# Patient Record
Sex: FEMALE | Race: OTHER | Employment: OTHER | URBAN - METROPOLITAN AREA
[De-identification: names, ages, dates, MRNs, and addresses within clinical notes are randomized per-mention and may not be internally consistent; named-entity substitution may affect disease eponyms.]

---

## 2020-10-08 ENCOUNTER — APPOINTMENT (OUTPATIENT)
Dept: ULTRASOUND IMAGING | Age: 25
End: 2020-10-08
Attending: PHYSICIAN ASSISTANT
Payer: COMMERCIAL

## 2020-10-08 ENCOUNTER — HOSPITAL ENCOUNTER (EMERGENCY)
Age: 25
Discharge: HOME | End: 2020-10-08
Attending: EMERGENCY MEDICINE
Payer: COMMERCIAL

## 2020-10-08 VITALS
HEIGHT: 61 IN | BODY MASS INDEX: 40.22 KG/M2 | TEMPERATURE: 97.8 F | SYSTOLIC BLOOD PRESSURE: 132 MMHG | OXYGEN SATURATION: 100 % | RESPIRATION RATE: 16 BRPM | DIASTOLIC BLOOD PRESSURE: 72 MMHG | HEART RATE: 60 BPM | WEIGHT: 213 LBS

## 2020-10-08 DIAGNOSIS — N93.8 DUB (DYSFUNCTIONAL UTERINE BLEEDING): Primary | ICD-10-CM

## 2020-10-08 DIAGNOSIS — N83.201 RIGHT OVARIAN CYST: ICD-10-CM

## 2020-10-08 DIAGNOSIS — Z97.5 IUD (INTRAUTERINE DEVICE) IN PLACE: ICD-10-CM

## 2020-10-08 LAB
ALBUMIN SERPL-MCNC: 3.3 G/DL (ref 3.4–5)
ALBUMIN/GLOB SERPL: 0.8 {RATIO} (ref 0.8–1.7)
ALP SERPL-CCNC: 87 U/L (ref 45–117)
ALT SERPL-CCNC: 20 U/L (ref 13–56)
ANION GAP SERPL CALC-SCNC: 3 MMOL/L (ref 3–18)
APPEARANCE UR: CLEAR
AST SERPL-CCNC: 15 U/L (ref 10–38)
BASOPHILS # BLD: 0 K/UL (ref 0–0.1)
BASOPHILS NFR BLD: 0 % (ref 0–2)
BILIRUB SERPL-MCNC: 0.3 MG/DL (ref 0.2–1)
BILIRUB UR QL: NEGATIVE
BUN SERPL-MCNC: 19 MG/DL (ref 7–18)
BUN/CREAT SERPL: 17 (ref 12–20)
CALCIUM SERPL-MCNC: 9 MG/DL (ref 8.5–10.1)
CHLORIDE SERPL-SCNC: 107 MMOL/L (ref 100–111)
CO2 SERPL-SCNC: 30 MMOL/L (ref 21–32)
COLOR UR: YELLOW
CREAT SERPL-MCNC: 1.11 MG/DL (ref 0.6–1.3)
DIFFERENTIAL METHOD BLD: ABNORMAL
EOSINOPHIL # BLD: 0.1 K/UL (ref 0–0.4)
EOSINOPHIL NFR BLD: 1 % (ref 0–5)
ERYTHROCYTE [DISTWIDTH] IN BLOOD BY AUTOMATED COUNT: 14.2 % (ref 11.6–14.5)
GLOBULIN SER CALC-MCNC: 4.1 G/DL (ref 2–4)
GLUCOSE SERPL-MCNC: 81 MG/DL (ref 74–99)
GLUCOSE UR STRIP.AUTO-MCNC: NEGATIVE MG/DL
HCG UR QL: NEGATIVE
HCT VFR BLD AUTO: 40 % (ref 35–45)
HGB BLD-MCNC: 12.5 G/DL (ref 12–16)
HGB UR QL STRIP: NEGATIVE
KETONES UR QL STRIP.AUTO: NEGATIVE MG/DL
LEUKOCYTE ESTERASE UR QL STRIP.AUTO: NEGATIVE
LYMPHOCYTES # BLD: 2 K/UL (ref 0.9–3.6)
LYMPHOCYTES NFR BLD: 20 % (ref 21–52)
MCH RBC QN AUTO: 26.7 PG (ref 24–34)
MCHC RBC AUTO-ENTMCNC: 31.3 G/DL (ref 31–37)
MCV RBC AUTO: 85.3 FL (ref 74–97)
MONOCYTES # BLD: 0.7 K/UL (ref 0.05–1.2)
MONOCYTES NFR BLD: 7 % (ref 3–10)
NEUTS SEG # BLD: 7.2 K/UL (ref 1.8–8)
NEUTS SEG NFR BLD: 72 % (ref 40–73)
NITRITE UR QL STRIP.AUTO: NEGATIVE
PH UR STRIP: 6 [PH] (ref 5–8)
PLATELET # BLD AUTO: 385 K/UL (ref 135–420)
PMV BLD AUTO: 9.3 FL (ref 9.2–11.8)
POTASSIUM SERPL-SCNC: 4 MMOL/L (ref 3.5–5.5)
PROT SERPL-MCNC: 7.4 G/DL (ref 6.4–8.2)
PROT UR STRIP-MCNC: NEGATIVE MG/DL
RBC # BLD AUTO: 4.69 M/UL (ref 4.2–5.3)
SERVICE CMNT-IMP: NORMAL
SODIUM SERPL-SCNC: 140 MMOL/L (ref 136–145)
SP GR UR REFRACTOMETRY: 1.03 (ref 1–1.03)
UROBILINOGEN UR QL STRIP.AUTO: 1 EU/DL (ref 0.2–1)
WBC # BLD AUTO: 10 K/UL (ref 4.6–13.2)
WET PREP GENITAL: NORMAL

## 2020-10-08 PROCEDURE — 87210 SMEAR WET MOUNT SALINE/INK: CPT

## 2020-10-08 PROCEDURE — 81025 URINE PREGNANCY TEST: CPT

## 2020-10-08 PROCEDURE — 80053 COMPREHEN METABOLIC PANEL: CPT

## 2020-10-08 PROCEDURE — 85025 COMPLETE CBC W/AUTO DIFF WBC: CPT

## 2020-10-08 PROCEDURE — 87491 CHLMYD TRACH DNA AMP PROBE: CPT

## 2020-10-08 PROCEDURE — 99284 EMERGENCY DEPT VISIT MOD MDM: CPT

## 2020-10-08 PROCEDURE — 81003 URINALYSIS AUTO W/O SCOPE: CPT

## 2020-10-08 PROCEDURE — 76830 TRANSVAGINAL US NON-OB: CPT

## 2020-10-08 RX ORDER — LEVONORGESTREL 52 MG/1
1 INTRAUTERINE DEVICE INTRAUTERINE ONCE
Status: ON HOLD | COMMUNITY
End: 2022-04-02 | Stop reason: CLARIF

## 2020-10-08 NOTE — ED PROVIDER NOTES
EMERGENCY DEPARTMENT HISTORY AND PHYSICAL EXAM    Date: 10/8/2020  Patient Name: Kelsey Zaragoza    History of Presenting Illness     Chief Complaint   Patient presents with    Vaginal Bleeding         History Provided By: Patient    Chief Complaint: vaginal bleeding    HPI(Context):   2:03 PM  Ronnie Schwartz is a 26 y.o. female with PMHX of interstitial cystitis who presents to the emergency department C/O vaginal bleeding. Sxs x 3 weeks. Reports intermittent pelvic cramping. [place]. Pt passed several clots yesterday. Pt reports new sexual partner one month ago but no known exposures. Pt denies fever, chills, dizziness, hx of DUB, and any other sxs or complaints. PCP: Sydnee Payton MD    Current Outpatient Medications   Medication Sig Dispense Refill    levonorgestreL (Mirena) 20 mcg/24 hours (5 yrs) 52 mg IUD 1 Device by IntraUTERine route once. Past History     Past Medical History:  Past Medical History:   Diagnosis Date    Interstitial cystitis     Nicotine vapor product user        Past Surgical History:  Past Surgical History:   Procedure Laterality Date    HX REFRACTIVE SURGERY         Family History:  History reviewed. No pertinent family history. Social History:  Social History     Tobacco Use    Smoking status: Never Smoker    Smokeless tobacco: Never Used   Substance Use Topics    Alcohol use: Not Currently    Drug use: Yes     Types: Marijuana       Allergies:  No Known Allergies      Review of Systems   Review of Systems   Gastrointestinal: Negative for abdominal pain, nausea and vomiting. Genitourinary: Positive for pelvic pain and vaginal bleeding. Negative for dysuria. Musculoskeletal: Negative for back pain. Neurological: Negative for dizziness and light-headedness. Hematological: Does not bruise/bleed easily. All other systems reviewed and are negative.       Physical Exam     Vitals:    10/08/20 1325 BP: 137/85   Pulse: 66   Resp: 18   Temp: 97.8 °F (36.6 °C)   SpO2: 99%   Weight: 96.6 kg (213 lb)   Height: 5' 1\" (1.549 m)     Physical Exam  Vitals signs and nursing note reviewed. Constitutional:       General: She is not in acute distress. Appearance: She is well-developed. She is not diaphoretic. Comments:  female in NAD. Alert. Appears comfortable. HENT:      Head: Normocephalic and atraumatic. Right Ear: External ear normal.      Left Ear: External ear normal.      Nose: Nose normal.   Eyes:      Conjunctiva/sclera: Conjunctivae normal.   Neck:      Musculoskeletal: Normal range of motion. Cardiovascular:      Rate and Rhythm: Normal rate and regular rhythm. Heart sounds: Normal heart sounds. No murmur. No friction rub. No gallop. Pulmonary:      Effort: Pulmonary effort is normal. No tachypnea, accessory muscle usage or respiratory distress. Breath sounds: Normal breath sounds. No decreased breath sounds, wheezing, rhonchi or rales. Abdominal:      Palpations: Abdomen is soft. Tenderness: There is no right CVA tenderness, left CVA tenderness or guarding. Negative signs include McBurney's sign. Genitourinary:     Comments: Female chaperone in room. Verbal consent obtained prior to presentation. See procedure note. Musculoskeletal: Normal range of motion. Skin:     General: Skin is warm and dry. Neurological:      Mental Status: She is alert and oriented to person, place, and time.    Psychiatric:         Judgment: Judgment normal.             Diagnostic Study Results     Labs -     Recent Results (from the past 12 hour(s))   URINALYSIS W/ RFLX MICROSCOPIC    Collection Time: 10/08/20  2:00 PM   Result Value Ref Range    Color YELLOW      Appearance CLEAR      Specific gravity 1.028 1.005 - 1.030      pH (UA) 6.0 5.0 - 8.0      Protein Negative NEG mg/dL    Glucose Negative NEG mg/dL    Ketone Negative NEG mg/dL    Bilirubin Negative NEG      Blood Negative NEG      Urobilinogen 1.0 0.2 - 1.0 EU/dL    Nitrites Negative NEG      Leukocyte Esterase Negative NEG     HCG URINE, QL. - POC    Collection Time: 10/08/20  2:23 PM   Result Value Ref Range    Pregnancy test,urine (POC) Negative NEG     CBC WITH AUTOMATED DIFF    Collection Time: 10/08/20  2:39 PM   Result Value Ref Range    WBC 10.0 4.6 - 13.2 K/uL    RBC 4.69 4.20 - 5.30 M/uL    HGB 12.5 12.0 - 16.0 g/dL    HCT 40.0 35.0 - 45.0 %    MCV 85.3 74.0 - 97.0 FL    MCH 26.7 24.0 - 34.0 PG    MCHC 31.3 31.0 - 37.0 g/dL    RDW 14.2 11.6 - 14.5 %    PLATELET 397 716 - 218 K/uL    MPV 9.3 9.2 - 11.8 FL    NEUTROPHILS 72 40 - 73 %    LYMPHOCYTES 20 (L) 21 - 52 %    MONOCYTES 7 3 - 10 %    EOSINOPHILS 1 0 - 5 %    BASOPHILS 0 0 - 2 %    ABS. NEUTROPHILS 7.2 1.8 - 8.0 K/UL    ABS. LYMPHOCYTES 2.0 0.9 - 3.6 K/UL    ABS. MONOCYTES 0.7 0.05 - 1.2 K/UL    ABS. EOSINOPHILS 0.1 0.0 - 0.4 K/UL    ABS. BASOPHILS 0.0 0.0 - 0.1 K/UL    DF AUTOMATED     METABOLIC PANEL, COMPREHENSIVE    Collection Time: 10/08/20  2:39 PM   Result Value Ref Range    Sodium 140 136 - 145 mmol/L    Potassium 4.0 3.5 - 5.5 mmol/L    Chloride 107 100 - 111 mmol/L    CO2 30 21 - 32 mmol/L    Anion gap 3 3.0 - 18 mmol/L    Glucose 81 74 - 99 mg/dL    BUN 19 (H) 7.0 - 18 MG/DL    Creatinine 1.11 0.6 - 1.3 MG/DL    BUN/Creatinine ratio 17 12 - 20      GFR est AA >60 >60 ml/min/1.73m2    GFR est non-AA >60 >60 ml/min/1.73m2    Calcium 9.0 8.5 - 10.1 MG/DL    Bilirubin, total 0.3 0.2 - 1.0 MG/DL    ALT (SGPT) 20 13 - 56 U/L    AST (SGOT) 15 10 - 38 U/L    Alk.  phosphatase 87 45 - 117 U/L    Protein, total 7.4 6.4 - 8.2 g/dL    Albumin 3.3 (L) 3.4 - 5.0 g/dL    Globulin 4.1 (H) 2.0 - 4.0 g/dL    A-G Ratio 0.8 0.8 - 1.7     WET PREP    Collection Time: 10/08/20  4:13 PM    Specimen: Vagina   Result Value Ref Range    Special Requests: NO SPECIAL REQUESTS      Wet prep NO YEAST,TRICHOMONAS OR CLUE CELLS NOTED           US TRANSVAGINAL W DOPPLER   Final Result   IMPRESSION:      1. Intrauterine contraceptive device in expected position. No abnormal   thickening of the endometrial stripe complex identified. 2. Normal blood flow to each ovary. No evidence of ovarian torsion. 3. Right ovarian cyst measuring approximately 3.2 cm in greatest dimension. CT Results  (Last 48 hours)    None        CXR Results  (Last 48 hours)    None          Medications given in the ED-  Medications - No data to display      Medical Decision Making   I am the first provider for this patient. I reviewed the vital signs, available nursing notes, past medical history, past surgical history, family history and social history. Vital Signs-Reviewed the patient's vital signs. Pulse Oximetry Analysis - 99% on RA. NORMAL    Records Reviewed: Nursing Notes    Provider Notes (Medical Decision Making): pregnancy (ectopic), DUB, endometriosis, IUD misplacement, STI    Procedures:  Pelvic Exam    Date/Time: 10/8/2020 2:32 PM  Performed by: PA  Procedure duration:  15 minutes. Documented by: Anna Marie Becker PA-C. Exam assisted by:  Female chaperone in [facility]. Type of exam performed: bimanual and speculum. External genitalia appearance: normal.    Vaginal exam:  bleeding. Bleeding: mild  Cervical exam:  no cervical motion tenderness. Specimen(s) collected:  chlamydia, GC, vaginal culture and products of conception. Bimanual exam:  normal.    Patient tolerance: Patient tolerated the procedure well with no immediate complications          ED Course:   2:03 PM Initial assessment performed. The patients presenting problems have been discussed, and they are in agreement with the care plan formulated and outlined with them. I have encouraged them to ask questions as they arise throughout their visit. Diagnosis and Disposition       Benign abdominal exam. UPT neg. IUD in place. Will instruct GYN FU and await GC/C labs. Reasons to RTED discussed with pt.  All questions answered. Pt feels comfortable going home at this time. Pt expressed understanding and she agrees with plan. 1. DUB (dysfunctional uterine bleeding)    2. IUD (intrauterine device) in place    3. Right ovarian cyst        PLAN:  1. D/C Home  2. Current Discharge Medication List        3. Follow-up Information     Follow up With Specialties Details Why Contact Info    Yoanna Arellano MD Obstetrics & Gynecology, Gynecology, Obstetrics   [address] [facility] [address] [phone]      [facility] EMERGENCY DEPT Emergency Medicine  As needed, If symptoms worsen [address] [facility] [address]  [phone]        _______________________________    Attestations: This note is prepared by Shira Astorga PA-C.  _______________________________          Please note that this dictation was completed with [employer], the computer voice recognition software. Quite often unanticipated grammatical, syntax, homophones, and other interpretive errors are inadvertently transcribed by the computer software. Please disregard these errors. Please excuse any errors that have escaped final proofreading.

## 2020-10-08 NOTE — ED TRIAGE NOTES
Pt w/ c/o vaginal bleeding x3 weeks w/ worsening of bleeding yesterday. Pt reports she had an IUD placed 4 weeks ago and had light bleeding starting a week after placement. Pt states she passed large clots yesterday, denies any dizziness, cp or SOB.

## 2020-10-10 LAB
C TRACH RRNA SPEC QL NAA+PROBE: NEGATIVE
N GONORRHOEA RRNA SPEC QL NAA+PROBE: NEGATIVE
SPECIMEN SOURCE: NORMAL

## 2020-12-04 ENCOUNTER — HOSPITAL ENCOUNTER (EMERGENCY)
Age: 25
Discharge: HOME | End: 2020-12-04
Attending: EMERGENCY MEDICINE
Payer: COMMERCIAL

## 2020-12-04 ENCOUNTER — APPOINTMENT (OUTPATIENT)
Dept: ULTRASOUND IMAGING | Age: 25
End: 2020-12-04
Attending: EMERGENCY MEDICINE
Payer: COMMERCIAL

## 2020-12-04 VITALS
BODY MASS INDEX: 40.48 KG/M2 | OXYGEN SATURATION: 99 % | HEART RATE: 76 BPM | TEMPERATURE: 97.1 F | HEIGHT: 62 IN | WEIGHT: 220 LBS | DIASTOLIC BLOOD PRESSURE: 70 MMHG | RESPIRATION RATE: 16 BRPM | SYSTOLIC BLOOD PRESSURE: 122 MMHG

## 2020-12-04 DIAGNOSIS — N30.00 ACUTE CYSTITIS WITHOUT HEMATURIA: ICD-10-CM

## 2020-12-04 DIAGNOSIS — R10.2 PELVIC PAIN: Primary | ICD-10-CM

## 2020-12-04 LAB
ALBUMIN SERPL-MCNC: 3.3 G/DL (ref 3.4–5)
ALBUMIN/GLOB SERPL: 0.8 {RATIO} (ref 0.8–1.7)
ALP SERPL-CCNC: 92 U/L (ref 45–117)
ALT SERPL-CCNC: 27 U/L (ref 13–56)
ANION GAP SERPL CALC-SCNC: 5 MMOL/L (ref 3–18)
APPEARANCE UR: CLEAR
AST SERPL-CCNC: 17 U/L (ref 10–38)
BACTERIA URNS QL MICRO: ABNORMAL /HPF
BASOPHILS # BLD: 0 K/UL (ref 0–0.1)
BASOPHILS NFR BLD: 0 % (ref 0–2)
BILIRUB SERPL-MCNC: 0.2 MG/DL (ref 0.2–1)
BILIRUB UR QL: NEGATIVE
BUN SERPL-MCNC: 22 MG/DL (ref 7–18)
BUN/CREAT SERPL: 24 (ref 12–20)
CALCIUM SERPL-MCNC: 8.8 MG/DL (ref 8.5–10.1)
CHLORIDE SERPL-SCNC: 106 MMOL/L (ref 100–111)
CO2 SERPL-SCNC: 27 MMOL/L (ref 21–32)
COLOR UR: YELLOW
CREAT SERPL-MCNC: 0.93 MG/DL (ref 0.6–1.3)
DIFFERENTIAL METHOD BLD: ABNORMAL
EOSINOPHIL # BLD: 0.3 K/UL (ref 0–0.4)
EOSINOPHIL NFR BLD: 3 % (ref 0–5)
EPITH CASTS URNS QL MICRO: ABNORMAL /LPF (ref 0–5)
ERYTHROCYTE [DISTWIDTH] IN BLOOD BY AUTOMATED COUNT: 14.7 % (ref 11.6–14.5)
GLOBULIN SER CALC-MCNC: 4 G/DL (ref 2–4)
GLUCOSE SERPL-MCNC: 84 MG/DL (ref 74–99)
GLUCOSE UR STRIP.AUTO-MCNC: NEGATIVE MG/DL
HCG UR QL: NEGATIVE
HCT VFR BLD AUTO: 38.5 % (ref 35–45)
HGB BLD-MCNC: 12.6 G/DL (ref 12–16)
HGB UR QL STRIP: NEGATIVE
KETONES UR QL STRIP.AUTO: NEGATIVE MG/DL
LEUKOCYTE ESTERASE UR QL STRIP.AUTO: ABNORMAL
LYMPHOCYTES # BLD: 2.8 K/UL (ref 0.9–3.6)
LYMPHOCYTES NFR BLD: 25 % (ref 21–52)
MCH RBC QN AUTO: 26.9 PG (ref 24–34)
MCHC RBC AUTO-ENTMCNC: 32.7 G/DL (ref 31–37)
MCV RBC AUTO: 82.1 FL (ref 74–97)
MONOCYTES # BLD: 0.7 K/UL (ref 0.05–1.2)
MONOCYTES NFR BLD: 6 % (ref 3–10)
NEUTS SEG # BLD: 7.4 K/UL (ref 1.8–8)
NEUTS SEG NFR BLD: 66 % (ref 40–73)
NITRITE UR QL STRIP.AUTO: NEGATIVE
PH UR STRIP: 7 [PH] (ref 5–8)
PLATELET # BLD AUTO: 379 K/UL (ref 135–420)
PMV BLD AUTO: 9.5 FL (ref 9.2–11.8)
POTASSIUM SERPL-SCNC: 4.5 MMOL/L (ref 3.5–5.5)
PROT SERPL-MCNC: 7.3 G/DL (ref 6.4–8.2)
PROT UR STRIP-MCNC: NEGATIVE MG/DL
RBC # BLD AUTO: 4.69 M/UL (ref 4.2–5.3)
RBC #/AREA URNS HPF: ABNORMAL /HPF (ref 0–5)
SERVICE CMNT-IMP: NORMAL
SODIUM SERPL-SCNC: 138 MMOL/L (ref 136–145)
SP GR UR REFRACTOMETRY: 1.01 (ref 1–1.03)
UROBILINOGEN UR QL STRIP.AUTO: 0.2 EU/DL (ref 0.2–1)
WBC # BLD AUTO: 11.1 K/UL (ref 4.6–13.2)
WBC URNS QL MICRO: ABNORMAL /HPF (ref 0–5)
WET PREP GENITAL: NORMAL

## 2020-12-04 PROCEDURE — 85025 COMPLETE CBC W/AUTO DIFF WBC: CPT

## 2020-12-04 PROCEDURE — 80053 COMPREHEN METABOLIC PANEL: CPT

## 2020-12-04 PROCEDURE — 81001 URINALYSIS AUTO W/SCOPE: CPT

## 2020-12-04 PROCEDURE — 81025 URINE PREGNANCY TEST: CPT

## 2020-12-04 PROCEDURE — 99284 EMERGENCY DEPT VISIT MOD MDM: CPT

## 2020-12-04 PROCEDURE — 76830 TRANSVAGINAL US NON-OB: CPT

## 2020-12-04 PROCEDURE — 87210 SMEAR WET MOUNT SALINE/INK: CPT

## 2020-12-04 PROCEDURE — 87491 CHLMYD TRACH DNA AMP PROBE: CPT

## 2020-12-04 PROCEDURE — 87086 URINE CULTURE/COLONY COUNT: CPT

## 2020-12-04 RX ORDER — CEPHALEXIN 500 MG/1
500 CAPSULE ORAL 3 TIMES DAILY
Qty: 21 CAP | Refills: 0 | Status: SHIPPED | OUTPATIENT
Start: 2020-12-04 | End: 2020-12-11

## 2020-12-04 NOTE — ED TRIAGE NOTES
Pt to ED w/ pelvic pain, worse to right side, x2wk. Pt reports IUD placement on Sept. 4th. Pt reports intermittent cramping, bleeding and some dysuria. Pt reports increased pain w/ intercourse.

## 2020-12-04 NOTE — DISCHARGE INSTRUCTIONS
Patient Education        Pelvic Pain: Care Instructions  Your Care Instructions     Pelvic pain, or pain in the lower belly, can have many causes. Often pelvic pain is not serious and gets better in a few days. If your pain continues or gets worse, you may need tests and treatment. Tell your doctor about any new symptoms. These may be signs of a serious problem. Follow-up care is a key part of your treatment and safety. Be sure to make and go to all appointments, and call your doctor if you are having problems. It's also a good idea to know your test results and keep a list of the medicines you take. How can you care for yourself at home? · Rest until you feel better. Lie down, and raise your legs by placing a pillow under your knees. · Drink plenty of fluids. You may find that small, frequent sips are easier on your stomach than if you drink a lot at once. Avoid drinks with carbonation or caffeine, such as soda pop, tea, or coffee. · Try eating several small meals instead of 2 or 3 large ones. Eat mild foods, such as rice, dry toast or crackers, bananas, and applesauce. Avoid fatty and spicy foods, other fruits, and alcohol until 48 hours after your symptoms have gone away. · Take an over-the-counter pain medicine, such as acetaminophen (Tylenol), ibuprofen (Advil, Motrin), or naproxen (Aleve). Read and follow all instructions on the label. · Do not take two or more pain medicines at the same time unless the doctor told you to. Many pain medicines have acetaminophen, which is Tylenol. Too much acetaminophen (Tylenol) can be harmful. · You can put a heating pad, a warm cloth, or moist heat on your belly to relieve pain. When should you call for help?    Call your doctor now or seek immediate medical care if:    · You have a new or higher fever.     · You have unusual vaginal bleeding.     · You have new or worse belly or pelvic pain.     · You have vaginal discharge that has increased in amount or smells bad.   Watch closely for changes in your health, and be sure to contact your doctor if:    · You do not get better as expected. Where can you learn more? Go to http://www.gray.com/  Enter B514 in the search box to learn more about \"Pelvic Pain: Care Instructions. \"  Current as of: November 8, 2019               Content Version: 12.6  © 5854-8402 [employer]. Care instructions adapted under license by [employer] (which disclaims liability or warranty for this information). If you have questions about a medical condition or this instruction, always ask your healthcare professional. [place] [address] any warranty or liability for your use of this information. Patient Education        Urinary Tract Infection in Women: Care Instructions  Your Care Instructions     A urinary tract infection, or UTI, is a general term for an infection anywhere between the kidneys and the urethra (where urine comes out). Most UTIs are bladder infections. They often cause pain or burning when you urinate. UTIs are caused by bacteria and can be cured with antibiotics. Be sure to complete your treatment so that the infection goes away. Follow-up care is a key part of your treatment and safety. Be sure to make and go to all appointments, and call your doctor if you are having problems. It's also a good idea to know your test results and keep a list of the medicines you take. How can you care for yourself at home? · Take your antibiotics as directed. Do not stop taking them just because you feel better. You need to take the full course of antibiotics. · Drink extra water and other fluids for the next day or two. This may help wash out the bacteria that are causing the infection. (If you have kidney, heart, or liver disease and have to limit fluids, talk with your doctor before you increase your fluid intake.)  · Avoid drinks that are carbonated or have caffeine. They can irritate the bladder. · Urinate often. Try to empty your bladder each time. · To relieve pain, take a hot bath or lay a heating pad set on low over your lower belly or genital area. Never go to sleep with a heating pad in place. To prevent UTIs  · Drink plenty of water each day. This helps you urinate often, which clears bacteria from your system. (If you have kidney, heart, or liver disease and have to limit fluids, talk with your doctor before you increase your fluid intake.)  · Urinate when you need to. · Urinate right after you have sex. · Change sanitary pads often. · Avoid douches, bubble baths, feminine hygiene sprays, and other feminine hygiene products that have deodorants. · After going to the bathroom, wipe from front to back. When should you call for help? Call your doctor now or seek immediate medical care if:    · Symptoms such as fever, chills, nausea, or vomiting get worse or appear for the first time.     · You have new pain in your back just below your rib cage. This is called flank pain.     · There is new blood or pus in your urine.     · You have any problems with your antibiotic medicine. Watch closely for changes in your health, and be sure to contact your doctor if:    · You are not getting better after taking an antibiotic for 2 days.     · Your symptoms go away but then come back. Where can you learn more? Go to http://www.gray.com/  Enter O509 in the search box to learn more about \"Urinary Tract Infection in Women: Care Instructions. \"  Current as of: June 29, 2020               Content Version: 12.6  © 7936-4945 [employer]. Care instructions adapted under license by [employer] (which disclaims liability or warranty for this information).  If you have questions about a medical condition or this instruction, always ask your healthcare professional. [address] any warranty or liability for your use of this information.

## 2020-12-04 NOTE — ED PROVIDER NOTES
EMERGENCY DEPARTMENT HISTORY AND PHYSICAL EXAM    Date: 12/4/2020  Patient Name: Gibran Curtis    History of Presenting Illness     Chief Complaint   Patient presents with    Pelvic Pain         History Provided By: Patient     11:07 AM  Ronnie Valenzuela is a 26 y.o. female with PMHX of interstitial cystitis, IUD placed 3 months ago who presents to the emergency department C/O leg pain. Patient reports that initially after the IUD she did not have symptoms but in the last 2 weeks she has felt like the IUD is moving and has severe pelvic pain with movement and with intercourse. She also reports she has had spotting after intercourse and today started noting some dysuria. She denies any nausea, vomiting, fever, cough, chest pain, bowel symptoms. No known sick contacts or recent travel. PCP: Kolton Del Toro MD    Current Outpatient Medications   Medication Sig Dispense Refill    cephALEXin (Keflex) 500 mg capsule Take 1 Cap by mouth three (3) times daily for 7 days. 21 Cap 0    levonorgestreL (Mirena) 20 mcg/24 hours (5 yrs) 52 mg IUD 1 Device by IntraUTERine route once. Past History     Past Medical History:  Past Medical History:   Diagnosis Date    Interstitial cystitis     Interstitial cystitis     Nicotine vapor product user        Past Surgical History:  Past Surgical History:   Procedure Laterality Date    HX REFRACTIVE SURGERY         Family History:  History reviewed. No pertinent family history. Social History:  Social History     Tobacco Use    Smoking status: Never Smoker    Smokeless tobacco: Never Used   Substance Use Topics    Alcohol use: Not Currently    Drug use: Yes     Types: Marijuana       Allergies:  No Known Allergies      Review of Systems   Review of Systems   Constitutional: Negative for fever. Respiratory: Negative for shortness of breath. Cardiovascular: Negative for chest pain. Gastrointestinal: Negative for nausea and vomiting. Genitourinary: Positive for dysuria, pelvic pain and vaginal bleeding. All other systems reviewed and are negative. Physical Exam     Vitals:    12/04/20 1100   BP: 122/70   Pulse: 76   Resp: 16   Temp: 97.1 °F (36.2 °C)   SpO2: 99%   Weight: 99.8 kg (220 lb)   Height: 5' 2\" (1.575 m)     Physical Exam    Nursing notes and vital signs reviewed    Constitutional: Non toxic appearing, moderate distress  Head: Normocephalic, Atraumatic  Eyes: EOMI  Neck: Supple  Cardiovascular: Regular rate and rhythm, no murmurs, rubs, or gallops  Chest: Normal work of breathing and chest excursion bilaterally  Lungs: Clear to ausculation bilaterally  Abdomen: Soft, tender across lower abdomen without rebound or guarding, non distended, normoactive bowel sounds  Pelvic: See below  Back: No evidence of trauma or deformity  Extremities: No evidence of trauma or deformity  Skin: Warm and dry, normal cap refill  Neuro: Alert and appropriate  Psychiatric: Normal mood and affect      Diagnostic Study Results     Labs -     Recent Results (from the past 12 hour(s))   CBC WITH AUTOMATED DIFF    Collection Time: 12/04/20 11:40 AM   Result Value Ref Range    WBC 11.1 4.6 - 13.2 K/uL    RBC 4.69 4.20 - 5.30 M/uL    HGB 12.6 12.0 - 16.0 g/dL    HCT 38.5 35.0 - 45.0 %    MCV 82.1 74.0 - 97.0 FL    MCH 26.9 24.0 - 34.0 PG    MCHC 32.7 31.0 - 37.0 g/dL    RDW 14.7 (H) 11.6 - 14.5 %    PLATELET 487 191 - 526 K/uL    MPV 9.5 9.2 - 11.8 FL    NEUTROPHILS 66 40 - 73 %    LYMPHOCYTES 25 21 - 52 %    MONOCYTES 6 3 - 10 %    EOSINOPHILS 3 0 - 5 %    BASOPHILS 0 0 - 2 %    ABS. NEUTROPHILS 7.4 1.8 - 8.0 K/UL    ABS. LYMPHOCYTES 2.8 0.9 - 3.6 K/UL    ABS. MONOCYTES 0.7 0.05 - 1.2 K/UL    ABS. EOSINOPHILS 0.3 0.0 - 0.4 K/UL    ABS.  BASOPHILS 0.0 0.0 - 0.1 K/UL    DF AUTOMATED     METABOLIC PANEL, COMPREHENSIVE    Collection Time: 12/04/20 11:40 AM   Result Value Ref Range    Sodium 138 136 - 145 mmol/L    Potassium 4.5 3.5 - 5.5 mmol/L    Chloride 106 100 - 111 mmol/L    CO2 27 21 - 32 mmol/L    Anion gap 5 3.0 - 18 mmol/L    Glucose 84 74 - 99 mg/dL    BUN 22 (H) 7.0 - 18 MG/DL    Creatinine 0.93 0.6 - 1.3 MG/DL    BUN/Creatinine ratio 24 (H) 12 - 20      GFR est AA >60 >60 ml/min/1.73m2    GFR est non-AA >60 >60 ml/min/1.73m2    Calcium 8.8 8.5 - 10.1 MG/DL    Bilirubin, total 0.2 0.2 - 1.0 MG/DL    ALT (SGPT) 27 13 - 56 U/L    AST (SGOT) 17 10 - 38 U/L    Alk. phosphatase 92 45 - 117 U/L    Protein, total 7.3 6.4 - 8.2 g/dL    Albumin 3.3 (L) 3.4 - 5.0 g/dL    Globulin 4.0 2.0 - 4.0 g/dL    A-G Ratio 0.8 0.8 - 1.7     URINALYSIS W/ RFLX MICROSCOPIC    Collection Time: 12/04/20 12:00 PM   Result Value Ref Range    Color YELLOW      Appearance CLEAR      Specific gravity 1.013 1.005 - 1.030      pH (UA) 7.0 5.0 - 8.0      Protein Negative NEG mg/dL    Glucose Negative NEG mg/dL    Ketone Negative NEG mg/dL    Bilirubin Negative NEG      Blood Negative NEG      Urobilinogen 0.2 0.2 - 1.0 EU/dL    Nitrites Negative NEG      Leukocyte Esterase MODERATE (A) NEG     URINE MICROSCOPIC ONLY    Collection Time: 12/04/20 12:00 PM   Result Value Ref Range    WBC 21 to 30 0 - 5 /hpf    RBC 0 to 3 0 - 5 /hpf    Epithelial cells 1+ 0 - 5 /lpf    Bacteria 1+ (A) NEG /hpf   WET PREP    Collection Time: 12/04/20 12:50 PM    Specimen: Vagina   Result Value Ref Range    Special Requests: NO SPECIAL REQUESTS      Wet prep NO YEAST,TRICHOMONAS OR CLUE CELLS NOTED     HCG URINE, QL. - POC    Collection Time: 12/04/20 12:54 PM   Result Value Ref Range    Pregnancy test,urine (POC) Negative NEG         Radiologic Studies -   US TRANSVAGINAL W DOPPLER   Final Result   IMPRESSION:      IUD appears appropriately positioned within the endometrial canal.   No evidence of ovarian torsion seen on either side. Bilateral ovarian follicles/cysts.         CT Results  (Last 48 hours)    None        CXR Results  (Last 48 hours)    None          Medications given in the ED-  Medications - No data to display      Medical Decision Making   I am the first provider for this patient. I reviewed the vital signs, available nursing notes, past medical history, past surgical history, family history and social history. Vital Signs-Reviewed the patient's vital signs. Pulse Oximetry Analysis - 99% on room air, not hypoxic     Records Reviewed: Nursing Notes    Provider Notes (Medical Decision Making): Ha Presley is a 26 y.o. female presenting for pelvic pain and feeling like her IUD is out of place. IUD is in proper place on ultrasound but strings are not able to be visualized or palpated. Labs are benign except for UA which reveals UTI. Urine culture sent and will cover with appropriate antibiotics. Discussed with OB/GYN inability to visualize IUD strings and will discharge with plan for early OB/GYN follow-up for office retrieval with return precautions. Patient understands and agrees with this plan. Procedures:  Procedures    ED Course:   12:52 PM  Exam was chaperoned by [facility] LPN  Normal external genitalia. Physiologic discharge in the vault. Normal cervix appearance but no IUD strings visible or palpable. On bimanual exam, cervical, uterine, or adnexal tenderness. CONSULT NOTE:   12:59 PM  Dr. Governor Cardenas spoke with Dr. Travis Murguia   Specialty: OB-Gyn  Discussed pt's hx, disposition, and available diagnostic and imaging results over the telephone. Reviewed care plans. Recommends pain control and outpatient follow up to attempt IUD retrieval.     1:17 PM  Updated patient on all results and plan. All questions answered. Diagnosis and Disposition     Critical Care: None    DISCHARGE NOTE:    Gal Vegaservando's  results have been reviewed with her. She has been counseled regarding her diagnosis, treatment, and plan.   She verbally conveys understanding and agreement of the signs, symptoms, diagnosis, treatment and prognosis and additionally agrees to follow up as discussed. She also agrees with the care-plan and conveys that all of her questions have been answered. I have also provided discharge instructions for her that include: educational information regarding their diagnosis and treatment, and list of reasons why they would want to return to the ED prior to their follow-up appointment, should her condition change. She has been provided with education for proper emergency department utilization. CLINICAL IMPRESSION:    1. Pelvic pain    2. Acute cystitis without hematuria        PLAN:  1. D/C Home  2. Discharge Medication List as of 12/4/2020  1:17 PM      START taking these medications    Details   cephALEXin (Keflex) 500 mg capsule Take 1 Cap by mouth three (3) times daily for 7 days. , Normal, Disp-21 Cap,R-0         CONTINUE these medications which have NOT CHANGED    Details   levonorgestreL (Mirena) 20 mcg/24 hours (5 yrs) 52 mg IUD 1 Device by IntraUTERine route once., Historical Med           3. Follow-up Information     Follow up With Specialties Details Why Rashard Donahue MD Obstetrics & Gynecology, Gynecology, Obstetrics Schedule an appointment as soon as possible for a visit  [address] [place] [address]      [facility] EMERGENCY DEPT Emergency Medicine  If symptoms worsen [address] [address] [phone]        _______________________________      Please note that this dictation was completed with [employer]GL[employer]IC, the computer voice recognition software. Quite often unanticipated grammatical, syntax, homophones, and other interpretive errors are inadvertently transcribed by the computer software. Please disregard these errors. Please excuse any errors that have escaped final proofreading.

## 2020-12-05 LAB
BACTERIA SPEC CULT: NORMAL
SERVICE CMNT-IMP: NORMAL

## 2021-08-06 ENCOUNTER — HOSPITAL ENCOUNTER (EMERGENCY)
Age: 26
Discharge: HOME | End: 2021-08-06
Attending: EMERGENCY MEDICINE
Payer: COMMERCIAL

## 2021-08-06 ENCOUNTER — APPOINTMENT (OUTPATIENT)
Dept: ULTRASOUND IMAGING | Age: 26
End: 2021-08-06
Attending: EMERGENCY MEDICINE
Payer: COMMERCIAL

## 2021-08-06 VITALS
HEIGHT: 62 IN | DIASTOLIC BLOOD PRESSURE: 60 MMHG | RESPIRATION RATE: 15 BRPM | WEIGHT: 209 LBS | OXYGEN SATURATION: 100 % | BODY MASS INDEX: 38.46 KG/M2 | TEMPERATURE: 97 F | SYSTOLIC BLOOD PRESSURE: 109 MMHG | HEART RATE: 54 BPM

## 2021-08-06 DIAGNOSIS — O20.0 THREATENED MISCARRIAGE IN EARLY PREGNANCY: Primary | ICD-10-CM

## 2021-08-06 LAB
ALBUMIN SERPL-MCNC: 3.2 G/DL (ref 3.4–5)
ALBUMIN/GLOB SERPL: 0.9 {RATIO} (ref 0.8–1.7)
ALP SERPL-CCNC: 75 U/L (ref 45–117)
ALT SERPL-CCNC: 20 U/L (ref 13–56)
ANION GAP SERPL CALC-SCNC: 4 MMOL/L (ref 3–18)
APPEARANCE UR: CLEAR
AST SERPL-CCNC: 12 U/L (ref 10–38)
BASOPHILS # BLD: 0 K/UL (ref 0–0.1)
BASOPHILS NFR BLD: 0 % (ref 0–2)
BILIRUB SERPL-MCNC: 0.2 MG/DL (ref 0.2–1)
BILIRUB UR QL: NEGATIVE
BUN SERPL-MCNC: 11 MG/DL (ref 7–18)
BUN/CREAT SERPL: 12 (ref 12–20)
CALCIUM SERPL-MCNC: 9.2 MG/DL (ref 8.5–10.1)
CHLORIDE SERPL-SCNC: 107 MMOL/L (ref 100–111)
CO2 SERPL-SCNC: 28 MMOL/L (ref 21–32)
COLOR UR: YELLOW
CREAT SERPL-MCNC: 0.89 MG/DL (ref 0.6–1.3)
DIFFERENTIAL METHOD BLD: ABNORMAL
EOSINOPHIL # BLD: 0.1 K/UL (ref 0–0.4)
EOSINOPHIL NFR BLD: 1 % (ref 0–5)
ERYTHROCYTE [DISTWIDTH] IN BLOOD BY AUTOMATED COUNT: 13.7 % (ref 11.6–14.5)
GLOBULIN SER CALC-MCNC: 3.6 G/DL (ref 2–4)
GLUCOSE SERPL-MCNC: 91 MG/DL (ref 74–99)
GLUCOSE UR STRIP.AUTO-MCNC: NEGATIVE MG/DL
HCG SERPL-ACNC: 1670 MIU/ML (ref 0–10)
HCG UR QL: POSITIVE
HCG UR QL: POSITIVE
HCT VFR BLD AUTO: 36.9 % (ref 35–45)
HGB BLD-MCNC: 12 G/DL (ref 12–16)
HGB UR QL STRIP: NEGATIVE
KETONES UR QL STRIP.AUTO: NEGATIVE MG/DL
LEUKOCYTE ESTERASE UR QL STRIP.AUTO: NEGATIVE
LYMPHOCYTES # BLD: 3.1 K/UL (ref 0.9–3.6)
LYMPHOCYTES NFR BLD: 22 % (ref 21–52)
MCH RBC QN AUTO: 28.8 PG (ref 24–34)
MCHC RBC AUTO-ENTMCNC: 32.5 G/DL (ref 31–37)
MCV RBC AUTO: 88.5 FL (ref 74–97)
MONOCYTES # BLD: 0.7 K/UL (ref 0.05–1.2)
MONOCYTES NFR BLD: 5 % (ref 3–10)
NEUTS SEG # BLD: 9.9 K/UL (ref 1.8–8)
NEUTS SEG NFR BLD: 71 % (ref 40–73)
NITRITE UR QL STRIP.AUTO: NEGATIVE
PH UR STRIP: 6.5 [PH] (ref 5–8)
PLATELET # BLD AUTO: 335 K/UL (ref 135–420)
PMV BLD AUTO: 9.6 FL (ref 9.2–11.8)
POTASSIUM SERPL-SCNC: 3.7 MMOL/L (ref 3.5–5.5)
PROT SERPL-MCNC: 6.8 G/DL (ref 6.4–8.2)
PROT UR STRIP-MCNC: NEGATIVE MG/DL
RBC # BLD AUTO: 4.17 M/UL (ref 4.2–5.3)
SERVICE CMNT-IMP: NORMAL
SODIUM SERPL-SCNC: 139 MMOL/L (ref 136–145)
SP GR UR REFRACTOMETRY: 1.01 (ref 1–1.03)
UROBILINOGEN UR QL STRIP.AUTO: 0.2 EU/DL (ref 0.2–1)
WBC # BLD AUTO: 14 K/UL (ref 4.6–13.2)
WET PREP GENITAL: NORMAL

## 2021-08-06 PROCEDURE — 84702 CHORIONIC GONADOTROPIN TEST: CPT

## 2021-08-06 PROCEDURE — 81025 URINE PREGNANCY TEST: CPT

## 2021-08-06 PROCEDURE — 80053 COMPREHEN METABOLIC PANEL: CPT

## 2021-08-06 PROCEDURE — 87210 SMEAR WET MOUNT SALINE/INK: CPT

## 2021-08-06 PROCEDURE — 99284 EMERGENCY DEPT VISIT MOD MDM: CPT

## 2021-08-06 PROCEDURE — 87491 CHLMYD TRACH DNA AMP PROBE: CPT

## 2021-08-06 PROCEDURE — 85025 COMPLETE CBC W/AUTO DIFF WBC: CPT

## 2021-08-06 PROCEDURE — 87086 URINE CULTURE/COLONY COUNT: CPT

## 2021-08-06 PROCEDURE — 81003 URINALYSIS AUTO W/O SCOPE: CPT

## 2021-08-06 PROCEDURE — 76817 TRANSVAGINAL US OBSTETRIC: CPT

## 2021-08-06 NOTE — ED PROVIDER NOTES
EMERGENCY DEPARTMENT HISTORY AND PHYSICAL EXAM    Date: 2021  Patient Name: Stan Garcia    History of Presenting Illness     Chief Complaint   Patient presents with    Abdominal Pain         History Provided By: Patient and family friend    Additional History (Context): Stan Garcia is a 26 y.o. female with interstitial cystitis who presents with lateral left greater than right lower abdominal/pelvic pain today. Denies vaginal bleeding. Having slight dysuria and increased vaginal discharge normal color. Denies any malodor to her discharge. Denies fever flank pain. Had a positive pregnancy test at home today. She is a prima  with an LMP of . PCP: Candy Self MD    Current Outpatient Medications   Medication Sig Dispense Refill    prenatal multivit-ca-min-fe-fa (Prenatal Vitamin) tab Take 1 Tablet by mouth daily. 30 Tablet 0    levonorgestreL (Mirena) 20 mcg/24 hours (5 yrs) 52 mg IUD 1 Device by IntraUTERine route once. Past History     Past Medical History:  Past Medical History:   Diagnosis Date    Interstitial cystitis     Interstitial cystitis     Nicotine vapor product user        Past Surgical History:  Past Surgical History:   Procedure Laterality Date    HX REFRACTIVE SURGERY         Family History:  History reviewed. No pertinent family history. Social History:  Social History     Tobacco Use    Smoking status: Never Smoker    Smokeless tobacco: Never Used   Substance Use Topics    Alcohol use: Yes     Comment: social    Drug use: Yes     Types: Marijuana       Allergies:  No Known Allergies      Review of Systems   Review of Systems   Constitutional: Negative for fever. HENT: Negative. Eyes: Negative. Respiratory: Negative. Cardiovascular: Negative. Gastrointestinal: Negative. Endocrine: Negative. Genitourinary: Positive for dysuria, frequency, pelvic pain and vaginal discharge.  Negative for flank pain and vaginal bleeding. Musculoskeletal: Negative. Skin: Negative. Allergic/Immunologic: Negative. Neurological: Negative. Hematological: Negative. Psychiatric/Behavioral: Negative. All Other Systems Negative  Physical Exam     Vitals:    08/06/21 1742 08/06/21 1935   BP: 112/72 109/60   Pulse: 64 (!) 54   Resp: 12 15   Temp: 97 °F (36.1 °C)    SpO2: 100% 100%   Weight: 94.8 kg (209 lb)    Height: 5' 2\" (1.575 m)      Physical Exam  Vitals and nursing note reviewed. Constitutional:       Appearance: She is well-developed. HENT:      Head: Normocephalic and atraumatic. Eyes:      Pupils: Pupils are equal, round, and reactive to light. Neck:      Thyroid: No thyromegaly. Vascular: No JVD. Trachea: No tracheal deviation. Cardiovascular:      Rate and Rhythm: Normal rate and regular rhythm. Heart sounds: Normal heart sounds. No murmur heard. No friction rub. No gallop. Pulmonary:      Effort: Pulmonary effort is normal. No respiratory distress. Breath sounds: Normal breath sounds. No stridor. No wheezing or rales. Chest:      Chest wall: No tenderness. Abdominal:      General: There is no distension. Palpations: Abdomen is soft. There is no mass. Tenderness: There is abdominal tenderness in the right lower quadrant and left lower quadrant. There is no guarding or rebound. Musculoskeletal:         General: No tenderness. Lymphadenopathy:      Cervical: No cervical adenopathy. Skin:     General: Skin is warm and dry. Coloration: Skin is not pale. Findings: No erythema or rash. Neurological:      Mental Status: She is alert and oriented to person, place, and time. Psychiatric:         Behavior: Behavior normal.         Thought Content:  Thought content normal.              Diagnostic Study Results     Labs -     Recent Results (from the past 12 hour(s))   URINALYSIS W/ RFLX MICROSCOPIC    Collection Time: 08/06/21  5:53 PM   Result Value Ref Range    Color YELLOW      Appearance CLEAR      Specific gravity 1.006 1.005 - 1.030      pH (UA) 6.5 5.0 - 8.0      Protein Negative NEG mg/dL    Glucose Negative NEG mg/dL    Ketone Negative NEG mg/dL    Bilirubin Negative NEG      Blood Negative NEG      Urobilinogen 0.2 0.2 - 1.0 EU/dL    Nitrites Negative NEG      Leukocyte Esterase Negative NEG     HCG URINE, QL    Collection Time: 08/06/21  5:53 PM   Result Value Ref Range    HCG urine, QL Positive (A) NEG     HCG URINE, QL. - POC    Collection Time: 08/06/21  5:59 PM   Result Value Ref Range    Pregnancy test,urine (POC) Positive (A) NEG     WET PREP    Collection Time: 08/06/21  6:00 PM    Specimen: Endocervix   Result Value Ref Range    Special Requests: NO SPECIAL REQUESTS      Wet prep NO YEAST,TRICHOMONAS OR CLUE CELLS NOTED     CBC WITH AUTOMATED DIFF    Collection Time: 08/06/21  6:06 PM   Result Value Ref Range    WBC 14.0 (H) 4.6 - 13.2 K/uL    RBC 4.17 (L) 4.20 - 5.30 M/uL    HGB 12.0 12.0 - 16.0 g/dL    HCT 36.9 35.0 - 45.0 %    MCV 88.5 74.0 - 97.0 FL    MCH 28.8 24.0 - 34.0 PG    MCHC 32.5 31.0 - 37.0 g/dL    RDW 13.7 11.6 - 14.5 %    PLATELET 158 360 - 852 K/uL    MPV 9.6 9.2 - 11.8 FL    NEUTROPHILS 71 40 - 73 %    LYMPHOCYTES 22 21 - 52 %    MONOCYTES 5 3 - 10 %    EOSINOPHILS 1 0 - 5 %    BASOPHILS 0 0 - 2 %    ABS. NEUTROPHILS 9.9 (H) 1.8 - 8.0 K/UL    ABS. LYMPHOCYTES 3.1 0.9 - 3.6 K/UL    ABS. MONOCYTES 0.7 0.05 - 1.2 K/UL    ABS. EOSINOPHILS 0.1 0.0 - 0.4 K/UL    ABS.  BASOPHILS 0.0 0.0 - 0.1 K/UL    DF AUTOMATED     METABOLIC PANEL, COMPREHENSIVE    Collection Time: 08/06/21  6:06 PM   Result Value Ref Range    Sodium 139 136 - 145 mmol/L    Potassium 3.7 3.5 - 5.5 mmol/L    Chloride 107 100 - 111 mmol/L    CO2 28 21 - 32 mmol/L    Anion gap 4 3.0 - 18 mmol/L    Glucose 91 74 - 99 mg/dL    BUN 11 7.0 - 18 MG/DL    Creatinine 0.89 0.6 - 1.3 MG/DL    BUN/Creatinine ratio 12 12 - 20      GFR est AA >60 >60 ml/min/1.73m2    GFR est non-AA >60 >60 ml/min/1.73m2    Calcium 9.2 8.5 - 10.1 MG/DL    Bilirubin, total 0.2 0.2 - 1.0 MG/DL    ALT (SGPT) 20 13 - 56 U/L    AST (SGOT) 12 10 - 38 U/L    Alk. phosphatase 75 45 - 117 U/L    Protein, total 6.8 6.4 - 8.2 g/dL    Albumin 3.2 (L) 3.4 - 5.0 g/dL    Globulin 3.6 2.0 - 4.0 g/dL    A-G Ratio 0.9 0.8 - 1.7     BETA HCG, QT    Collection Time: 08/06/21  6:06 PM   Result Value Ref Range    Beta HCG, QT 1,670 (H) 0 - 10 MIU/ML       Radiologic Studies -   US OB TV W DOPPLER   Final Result      There is an anechoic fluid collection endometrium measuring 5 weeks +/- 1 week. There is question of a yolk sac. This may represent an early gestation versus   blighted ovum. This is a pregnancy of unknown location. Correlate with serial   beta-hCGs and follow-up with ultrasound as clinically indicated. Right ovary is not seen. Left ovary demonstrates no torsion however there is a complex septated structure   suggesting a probable corpus luteal cyst.      Small amount of free fluid in the cul-de-sac. CT Results  (Last 48 hours)    None        CXR Results  (Last 48 hours)    None            Medical Decision Making   I am the first provider for this patient. I reviewed the vital signs, available nursing notes, past medical history, past surgical history, family history and social history. Vital Signs-Reviewed the patient's vital signs. Records Reviewed: Nursing Notes    Procedures:  Pelvic Exam    Date/Time: 8/6/2021 6:08 PM  Performed by: PA  Procedure duration:  5 minutes. Exam assisted by:  nurse. Type of exam performed: speculum and bimanual.    External genitalia appearance: normal.    Vaginal exam:  bleeding. Cervical exam:  no cervical motion tenderness and os closed. Specimen(s) collected:  chlamydia, GC and vaginal culture.   Bimanual exam:  normal.    Patient tolerance: patient tolerated the procedure well with no immediate complications          Provider Notes (Medical Decision Making): hCG quant 1600. Pregnancy of unknown location with probable gestational sac and yolk sac. Advised patient on pelvic rest, return in 2 days for serial hCG quant testing and ultrasound. Directed to return for any worsening immediately. Explained ectopic pregnancy not excluded. MED RECONCILIATION:  No current facility-administered medications for this encounter. Current Outpatient Medications   Medication Sig    prenatal multivit-ca-min-fe-fa (Prenatal Vitamin) tab Take 1 Tablet by mouth daily.  levonorgestreL (Mirena) 20 mcg/24 hours (5 yrs) 52 mg IUD 1 Device by IntraUTERine route once. Disposition:  home    DISCHARGE NOTE:   7:55 PM    Pt has been reexamined. Patient has no new complaints, changes, or physical findings. Care plan outlined and precautions discussed. Results of labs, US were reviewed with the patient. All medications were reviewed with the patient; will d/c home with PNV. All of pt's questions and concerns were addressed. Patient was instructed and agrees to follow up with OB, ED, as well as to return to the ED upon further deterioration. Patient is ready to go home. Follow-up Information     Follow up With Specialties Details Why Contact Info    Leonardo Ho MD Obstetrics & Gynecology, Gynecology, Obstetrics Schedule an appointment as soon as possible for a visit in 3 days  [address] [place] [address] EMERGENCY DEPT Emergency Medicine  If symptoms worsen return immediately; return in 2d for repeat HCG testing and US [address]  [phone]          Current Discharge Medication List      START taking these medications    Details   prenatal multivit-ca-min-fe-fa (Prenatal Vitamin) tab Take 1 Tablet by mouth daily. Qty: 30 Tablet, Refills: 0  Start date: 8/6/2021             Diagnosis     Clinical Impression:   1.  Threatened miscarriage in early pregnancy

## 2021-08-06 NOTE — ED NOTES
Patient arrives with SO complaining of intermittent pelvic pain, positive pregnancy test today at home.

## 2021-08-07 LAB
BACTERIA SPEC CULT: NORMAL
SERVICE CMNT-IMP: NORMAL

## 2021-08-08 ENCOUNTER — APPOINTMENT (OUTPATIENT)
Dept: ULTRASOUND IMAGING | Age: 26
End: 2021-08-08
Attending: PHYSICIAN ASSISTANT
Payer: COMMERCIAL

## 2021-08-08 ENCOUNTER — HOSPITAL ENCOUNTER (EMERGENCY)
Age: 26
Discharge: HOME | End: 2021-08-08
Attending: EMERGENCY MEDICINE
Payer: COMMERCIAL

## 2021-08-08 VITALS
DIASTOLIC BLOOD PRESSURE: 43 MMHG | WEIGHT: 206 LBS | RESPIRATION RATE: 16 BRPM | SYSTOLIC BLOOD PRESSURE: 116 MMHG | BODY MASS INDEX: 37.91 KG/M2 | HEART RATE: 99 BPM | OXYGEN SATURATION: 99 % | TEMPERATURE: 98.5 F | HEIGHT: 62 IN

## 2021-08-08 DIAGNOSIS — O23.41 UTI (URINARY TRACT INFECTION) DURING PREGNANCY, FIRST TRIMESTER: ICD-10-CM

## 2021-08-08 DIAGNOSIS — R10.2 PELVIC PAIN AFFECTING PREGNANCY IN FIRST TRIMESTER, ANTEPARTUM: Primary | ICD-10-CM

## 2021-08-08 DIAGNOSIS — O26.891 PELVIC PAIN AFFECTING PREGNANCY IN FIRST TRIMESTER, ANTEPARTUM: Primary | ICD-10-CM

## 2021-08-08 LAB
ABO + RH BLD: NORMAL
ALBUMIN SERPL-MCNC: 3.3 G/DL (ref 3.4–5)
ALBUMIN/GLOB SERPL: 0.9 {RATIO} (ref 0.8–1.7)
ALP SERPL-CCNC: 74 U/L (ref 45–117)
ALT SERPL-CCNC: 22 U/L (ref 13–56)
ANION GAP SERPL CALC-SCNC: 4 MMOL/L (ref 3–18)
APPEARANCE UR: CLEAR
AST SERPL-CCNC: 17 U/L (ref 10–38)
BACTERIA URNS QL MICRO: ABNORMAL /HPF
BASOPHILS # BLD: 0 K/UL (ref 0–0.1)
BASOPHILS NFR BLD: 0 % (ref 0–2)
BILIRUB SERPL-MCNC: 0.3 MG/DL (ref 0.2–1)
BILIRUB UR QL: NEGATIVE
BUN SERPL-MCNC: 10 MG/DL (ref 7–18)
BUN/CREAT SERPL: 11 (ref 12–20)
CALCIUM SERPL-MCNC: 8.7 MG/DL (ref 8.5–10.1)
CHLORIDE SERPL-SCNC: 107 MMOL/L (ref 100–111)
CO2 SERPL-SCNC: 27 MMOL/L (ref 21–32)
COLOR UR: YELLOW
CREAT SERPL-MCNC: 0.88 MG/DL (ref 0.6–1.3)
DIFFERENTIAL METHOD BLD: NORMAL
EOSINOPHIL # BLD: 0.2 K/UL (ref 0–0.4)
EOSINOPHIL NFR BLD: 2 % (ref 0–5)
EPITH CASTS URNS QL MICRO: ABNORMAL /LPF (ref 0–5)
ERYTHROCYTE [DISTWIDTH] IN BLOOD BY AUTOMATED COUNT: 13.7 % (ref 11.6–14.5)
GLOBULIN SER CALC-MCNC: 3.8 G/DL (ref 2–4)
GLUCOSE SERPL-MCNC: 101 MG/DL (ref 74–99)
GLUCOSE UR STRIP.AUTO-MCNC: NEGATIVE MG/DL
HCG SERPL-ACNC: 3554 MIU/ML (ref 0–10)
HCT VFR BLD AUTO: 41.7 % (ref 35–45)
HGB BLD-MCNC: 13.3 G/DL (ref 12–16)
HGB UR QL STRIP: NEGATIVE
KETONES UR QL STRIP.AUTO: NEGATIVE MG/DL
LEUKOCYTE ESTERASE UR QL STRIP.AUTO: ABNORMAL
LIPASE SERPL-CCNC: 70 U/L (ref 73–393)
LYMPHOCYTES # BLD: 2.1 K/UL (ref 0.9–3.6)
LYMPHOCYTES NFR BLD: 22 % (ref 21–52)
MCH RBC QN AUTO: 28.1 PG (ref 24–34)
MCHC RBC AUTO-ENTMCNC: 31.9 G/DL (ref 31–37)
MCV RBC AUTO: 88.2 FL (ref 74–97)
MONOCYTES # BLD: 0.5 K/UL (ref 0.05–1.2)
MONOCYTES NFR BLD: 6 % (ref 3–10)
NEUTS SEG # BLD: 6.8 K/UL (ref 1.8–8)
NEUTS SEG NFR BLD: 70 % (ref 40–73)
NITRITE UR QL STRIP.AUTO: NEGATIVE
PH UR STRIP: 6.5 [PH] (ref 5–8)
PLATELET # BLD AUTO: 357 K/UL (ref 135–420)
PMV BLD AUTO: 9.3 FL (ref 9.2–11.8)
POTASSIUM SERPL-SCNC: 4.3 MMOL/L (ref 3.5–5.5)
PROT SERPL-MCNC: 7.1 G/DL (ref 6.4–8.2)
PROT UR STRIP-MCNC: NEGATIVE MG/DL
RBC # BLD AUTO: 4.73 M/UL (ref 4.2–5.3)
RBC #/AREA URNS HPF: ABNORMAL /HPF (ref 0–5)
SODIUM SERPL-SCNC: 138 MMOL/L (ref 136–145)
SP GR UR REFRACTOMETRY: 1.01 (ref 1–1.03)
UROBILINOGEN UR QL STRIP.AUTO: 0.2 EU/DL (ref 0.2–1)
WBC # BLD AUTO: 9.7 K/UL (ref 4.6–13.2)
WBC URNS QL MICRO: ABNORMAL /HPF (ref 0–5)

## 2021-08-08 PROCEDURE — 87147 CULTURE TYPE IMMUNOLOGIC: CPT

## 2021-08-08 PROCEDURE — 80053 COMPREHEN METABOLIC PANEL: CPT

## 2021-08-08 PROCEDURE — 81001 URINALYSIS AUTO W/SCOPE: CPT

## 2021-08-08 PROCEDURE — 86900 BLOOD TYPING SEROLOGIC ABO: CPT

## 2021-08-08 PROCEDURE — 74011250637 HC RX REV CODE- 250/637: Performed by: PHYSICIAN ASSISTANT

## 2021-08-08 PROCEDURE — 99284 EMERGENCY DEPT VISIT MOD MDM: CPT

## 2021-08-08 PROCEDURE — 84702 CHORIONIC GONADOTROPIN TEST: CPT

## 2021-08-08 PROCEDURE — 76817 TRANSVAGINAL US OBSTETRIC: CPT

## 2021-08-08 PROCEDURE — 87086 URINE CULTURE/COLONY COUNT: CPT

## 2021-08-08 PROCEDURE — 96361 HYDRATE IV INFUSION ADD-ON: CPT

## 2021-08-08 PROCEDURE — 96360 HYDRATION IV INFUSION INIT: CPT

## 2021-08-08 PROCEDURE — 74011250636 HC RX REV CODE- 250/636: Performed by: PHYSICIAN ASSISTANT

## 2021-08-08 PROCEDURE — 85025 COMPLETE CBC W/AUTO DIFF WBC: CPT

## 2021-08-08 PROCEDURE — 83690 ASSAY OF LIPASE: CPT

## 2021-08-08 RX ORDER — CEPHALEXIN 500 MG/1
500 CAPSULE ORAL 2 TIMES DAILY
Qty: 14 CAPSULE | Refills: 0 | Status: SHIPPED | OUTPATIENT
Start: 2021-08-08 | End: 2021-08-15

## 2021-08-08 RX ORDER — ACETAMINOPHEN 500 MG
1000 TABLET ORAL
Status: COMPLETED | OUTPATIENT
Start: 2021-08-08 | End: 2021-08-08

## 2021-08-08 RX ADMIN — SODIUM CHLORIDE, POTASSIUM CHLORIDE, SODIUM LACTATE AND CALCIUM CHLORIDE 1000 ML: 600; 310; 30; 20 INJECTION, SOLUTION INTRAVENOUS at 09:42

## 2021-08-08 RX ADMIN — ACETAMINOPHEN 1000 MG: 500 TABLET ORAL at 09:42

## 2021-08-08 NOTE — ED PROVIDER NOTES
EMERGENCY DEPARTMENT HISTORY AND PHYSICAL EXAM    Date: 8/8/2021  Patient Name: Deepak Barreto    History of Presenting Illness     Chief Complaint   Patient presents with    Pregnancy Problem         History Provided By: Patient    Chief Complaint: pelvic pain in pregnancy    HPI(Context):   8:52 AM  Ronnie Champagne is a 26 y.o. female with PMHX of interstitial cystitis who presents to the emergency department C/O pelvic pain. Associated sxs include back pain. Primigravida presenting at 6 weeks. Pt was seen at this facility 2 days ago for pelvic pain in pregnancy. US was nondiagnostic with a possible gestational sac appreciated but no yolk sac or fetal pole. HCG 1670. Pt was told to RTED for serial HCG and US. Pt notes pain worsened this AM in pelvic area. Cramping and radiates to back. Pt has hx of interstitial cystitis and is unsure if pain is related to that. Pt is new to area one year ago from [place]. Pt is followed by [facility]. Pt denies fever, chills, vaginal bleeding, and any other sxs or complaints. PCP: Marichuy Leal, DO    Current Outpatient Medications   Medication Sig Dispense Refill    cephALEXin (Keflex) 500 mg capsule Take 1 Capsule by mouth two (2) times a day for 7 days. Indications: bacterial urinary tract infection 14 Capsule 0    prenatal multivit-ca-min-fe-fa (Prenatal Vitamin) tab Take 1 Tablet by mouth daily. 30 Tablet 0    levonorgestreL (Mirena) 20 mcg/24 hours (5 yrs) 52 mg IUD 1 Device by IntraUTERine route once. Past History     Past Medical History:  Past Medical History:   Diagnosis Date    Interstitial cystitis     Interstitial cystitis     Nicotine vapor product user        Past Surgical History:  Past Surgical History:   Procedure Laterality Date    HX REFRACTIVE SURGERY         Family History:  History reviewed. No pertinent family history.     Social History:  Social History     Tobacco Use    Smoking status: Never Smoker  Smokeless tobacco: Never Used   Substance Use Topics    Alcohol use: Yes     Comment: social    Drug use: Yes     Types: Marijuana       Allergies:  No Known Allergies      Review of Systems   Review of Systems   Constitutional: Negative for chills and fever. Gastrointestinal: Positive for abdominal pain. Negative for nausea and vomiting. Genitourinary: Positive for dysuria and pelvic pain. Negative for vaginal bleeding and vaginal discharge. Musculoskeletal: Positive for back pain. All other systems reviewed and are negative. Physical Exam     Vitals:    08/08/21 1000 08/08/21 1030 08/08/21 1151 08/08/21 1247   BP: 115/62 123/61  (!) 116/43   Pulse:    99   Resp:    16   Temp:       SpO2: 100% 100% 100% 99%   Weight:       Height:         Physical Exam  Vitals and nursing note reviewed. Constitutional:       General: She is not in acute distress. Appearance: She is well-developed. She is not diaphoretic. Comments:  female in NAD. Alert. Appears mildly anxious   HENT:      Head: Normocephalic and atraumatic. Right Ear: External ear normal.      Left Ear: External ear normal.      Nose: Nose normal.   Eyes:      General: No scleral icterus. Right eye: No discharge. Left eye: No discharge. Conjunctiva/sclera: Conjunctivae normal.   Cardiovascular:      Rate and Rhythm: Normal rate and regular rhythm. Heart sounds: Normal heart sounds. No murmur heard. No friction rub. No gallop. Pulmonary:      Effort: Pulmonary effort is normal. No tachypnea, accessory muscle usage or respiratory distress. Breath sounds: Normal breath sounds. No decreased breath sounds, wheezing, rhonchi or rales. Abdominal:      Palpations: Abdomen is soft. Tenderness: There is abdominal tenderness in the suprapubic area. There is no left CVA tenderness, guarding or rebound. Negative signs include McBurney's sign.    Musculoskeletal:         General: Normal range of motion. Cervical back: Normal range of motion. Skin:     General: Skin is warm and dry. Neurological:      Mental Status: She is alert and oriented to person, place, and time. Psychiatric:         Mood and Affect: Mood is anxious. Judgment: Judgment normal.             Diagnostic Study Results     Labs -     Recent Results (from the past 12 hour(s))   BETA HCG, QT    Collection Time: 08/08/21  9:00 AM   Result Value Ref Range    Beta HCG, QT 3,554 (H) 0 - 10 MIU/ML   CBC WITH AUTOMATED DIFF    Collection Time: 08/08/21  9:00 AM   Result Value Ref Range    WBC 9.7 4.6 - 13.2 K/uL    RBC 4.73 4.20 - 5.30 M/uL    HGB 13.3 12.0 - 16.0 g/dL    HCT 41.7 35.0 - 45.0 %    MCV 88.2 74.0 - 97.0 FL    MCH 28.1 24.0 - 34.0 PG    MCHC 31.9 31.0 - 37.0 g/dL    RDW 13.7 11.6 - 14.5 %    PLATELET 762 235 - 480 K/uL    MPV 9.3 9.2 - 11.8 FL    NEUTROPHILS 70 40 - 73 %    LYMPHOCYTES 22 21 - 52 %    MONOCYTES 6 3 - 10 %    EOSINOPHILS 2 0 - 5 %    BASOPHILS 0 0 - 2 %    ABS. NEUTROPHILS 6.8 1.8 - 8.0 K/UL    ABS. LYMPHOCYTES 2.1 0.9 - 3.6 K/UL    ABS. MONOCYTES 0.5 0.05 - 1.2 K/UL    ABS. EOSINOPHILS 0.2 0.0 - 0.4 K/UL    ABS. BASOPHILS 0.0 0.0 - 0.1 K/UL    DF AUTOMATED     METABOLIC PANEL, COMPREHENSIVE    Collection Time: 08/08/21  9:00 AM   Result Value Ref Range    Sodium 138 136 - 145 mmol/L    Potassium 4.3 3.5 - 5.5 mmol/L    Chloride 107 100 - 111 mmol/L    CO2 27 21 - 32 mmol/L    Anion gap 4 3.0 - 18 mmol/L    Glucose 101 (H) 74 - 99 mg/dL    BUN 10 7.0 - 18 MG/DL    Creatinine 0.88 0.6 - 1.3 MG/DL    BUN/Creatinine ratio 11 (L) 12 - 20      GFR est AA >60 >60 ml/min/1.73m2    GFR est non-AA >60 >60 ml/min/1.73m2    Calcium 8.7 8.5 - 10.1 MG/DL    Bilirubin, total 0.3 0.2 - 1.0 MG/DL    ALT (SGPT) 22 13 - 56 U/L    AST (SGOT) 17 10 - 38 U/L    Alk.  phosphatase 74 45 - 117 U/L    Protein, total 7.1 6.4 - 8.2 g/dL    Albumin 3.3 (L) 3.4 - 5.0 g/dL    Globulin 3.8 2.0 - 4.0 g/dL    A-G Ratio 0.9 0.8 - 1.7 LIPASE    Collection Time: 08/08/21  9:00 AM   Result Value Ref Range    Lipase 70 (L) 73 - 393 U/L   URINALYSIS W/ RFLX MICROSCOPIC    Collection Time: 08/08/21  9:49 AM   Result Value Ref Range    Color YELLOW      Appearance CLEAR      Specific gravity 1.006 1.005 - 1.030      pH (UA) 6.5 5.0 - 8.0      Protein Negative NEG mg/dL    Glucose Negative NEG mg/dL    Ketone Negative NEG mg/dL    Bilirubin Negative NEG      Blood Negative NEG      Urobilinogen 0.2 0.2 - 1.0 EU/dL    Nitrites Negative NEG      Leukocyte Esterase LARGE (A) NEG     BLOOD TYPE, (ABO+RH)    Collection Time: 08/08/21  9:49 AM   Result Value Ref Range    ABO/Rh(D) A POSITIVE    URINE MICROSCOPIC ONLY    Collection Time: 08/08/21  9:49 AM   Result Value Ref Range    WBC 36 to 50 0 - 5 /hpf    RBC 0 to 3 0 - 5 /hpf    Epithelial cells 4+ 0 - 5 /lpf    Bacteria 1+ (A) NEG /hpf           US OB TV W DOPPLER   Final Result      1. Intrauterine gestational sac and yolk sac are visualized, consistent with   very early intrauterine gestation. No fetal pole is yet identified. Recommend   continued close clinical follow-up with sonography as needed. 2.  Left ovarian cystic structure likely represents a corpus luteal cyst, less   likely ectopic pregnancy. 3.  No evidence of left ovarian torsion. 4.  The right ovary is not visualized and therefore cannot be evaluated. 5.  Free fluid seen in the cul-de-sac and right adnexa. CT Results  (Last 48 hours)    None        CXR Results  (Last 48 hours)    None          Medications given in the ED-  Medications   lactated ringers bolus infusion 1,000 mL (0 mL IntraVENous IV Completed 8/8/21 1250)   acetaminophen (TYLENOL) tablet 1,000 mg (1,000 mg Oral Given 8/8/21 0942)         Medical Decision Making   I am the first provider for this patient. I reviewed the vital signs, available nursing notes, past medical history, past surgical history, family history and social history.     Vital Signs-Reviewed the patient's vital signs. Pulse Oximetry Analysis - 100% on RA. NORMAL     Records Reviewed: Nursing Notes, Old Medical Records, Previous Radiology Studies and Previous Laboratory Studies    Provider Notes (Medical Decision Making): ectopic, miscarriage, UTI, STI, interstitial cystitis. Procedures:  Procedures    ED Course:   8:52 AM Initial assessment performed. The patients presenting problems have been discussed, and they are in agreement with the care plan formulated and outlined with them. I have encouraged them to ask questions as they arise throughout their visit. Diagnosis and Disposition       US reveals early IUP with appropriately rising HCG. Will tx for UTI and await culture. FU with OB. Reasons to RTED discussed with pt. All questions answered. Pt feels comfortable going home at this time. Pt expressed understanding and she agrees with plan. 1. Pelvic pain affecting pregnancy in first trimester, antepartum    2. UTI (urinary tract infection) during pregnancy, first trimester        PLAN:  1. D/C Home  2. Discharge Medication List as of 8/8/2021 12:23 PM      START taking these medications    Details   cephALEXin (Keflex) 500 mg capsule Take 1 Capsule by mouth two (2) times a day for 7 days. Indications: bacterial urinary tract infection, Normal, Disp-14 Capsule, R-0         CONTINUE these medications which have NOT CHANGED    Details   prenatal multivit-ca-min-fe-fa (Prenatal Vitamin) tab Take 1 Tablet by mouth daily. , Normal, Disp-30 Tablet, R-0      levonorgestreL (Mirena) 20 mcg/24 hours (5 yrs) 52 mg IUD 1 Device by IntraUTERine route once., Historical Med           3.    Follow-up Information     Follow up With Specialties Details Why [facility] [address] [facility] [address]    [facility] EMERGENCY DEPT Emergency Medicine   [address] [address] [phone] _______________________________    Attestations: This note is prepared by Violeta Luong PA-C.  _______________________________      Please note that this dictation was completed with cloudControl, the computer voice recognition software. Quite often unanticipated grammatical, syntax, homophones, and other interpretive errors are inadvertently transcribed by the computer software. Please disregard these errors. Please excuse any errors that have escaped final proofreading.

## 2021-08-08 NOTE — ED TRIAGE NOTES
Patient ambulatory into ER c/o lower abd pain and pressure and re-eval of preg levels. Patient states she woke up with pain and pressure. Denies any vaginal bleeding. Was instructed to come back for repeat US and beta count.

## 2021-08-09 LAB
C TRACH RRNA SPEC QL NAA+PROBE: NEGATIVE
N GONORRHOEA RRNA SPEC QL NAA+PROBE: NEGATIVE
PLEASE NOTE:, 188601: NORMAL
SPECIMEN SOURCE: NORMAL

## 2021-08-11 LAB
BACTERIA SPEC CULT: ABNORMAL
CC UR VC: ABNORMAL
SERVICE CMNT-IMP: ABNORMAL

## 2021-08-27 LAB
CHLAMYDIA, EXTERNAL: NEGATIVE
HBSAG, EXTERNAL: NEGATIVE
N. GONORRHEA, EXTERNAL: NEGATIVE
RPR, EXTERNAL: NON REACTIVE
RUBELLA, EXTERNAL: NORMAL
TYPE, ABO & RH, EXTERNAL: NORMAL

## 2021-12-29 ENCOUNTER — HOSPITAL ENCOUNTER (OUTPATIENT)
Dept: PHYSICAL THERAPY | Age: 26
Discharge: HOME | End: 2021-12-29
Payer: COMMERCIAL

## 2021-12-29 PROCEDURE — 97162 PT EVAL MOD COMPLEX 30 MIN: CPT

## 2021-12-29 NOTE — PROGRESS NOTES
[facility]  [address], [address] [facility] [place]  Ph [phone]  Fx [phone]    Plan of Care/ Statement of Necessity for Physical Therapy Services    Patient name: Malik Andujar Start of Care: 2021   Referral source: Krista Davis MD : 1995    Medical Diagnosis: Left hip pain [M25.552]  Right hip pain [M25.551]   Onset Date:21    Treatment Diagnosis: left and right hip pain, SIJ                                              ICD-10: M25.552, M25.551   Prior Hospitalization: see medical history Provider#: [id number]   Medications: Verified on Patient summary List   Comorbidities/PMHx/Surgical Hx:   [x]? Other: interstitial cystitis dx in . First pregnancy- at 25 weeks right now: due date second week April    Prior level of function: functionally independent, no AD, moderately active lifestyle      The Plan of Care and following information is based on the information from the initial evaluation. Assessment/ key information: Patient is a 24 yo female who [facility] to [facility] with c/o bilateral hip pain/low back and pelvic pain. Patient is currently 25 weeks pregnant and reports pain started 3 weeks ago. Patient s/s are consistent with sacroiliac joint pain, mechanical low back pain, and bilateral anterior hip pain. Patient reports laying down decreases the pain and sitting/standing and walking increase the pain. Patient demonstrates decreased ROM, decreased strength, impaired gait and pain which limits ease with functional activities and mobility. Patient would benefit from skilled physical therapy to address the above limitations.        Evaluation Complexity History MEDIUM  Complexity : 1-2 comorbidities / personal factors will impact the outcome/ POC ; Examination MEDIUM Complexity : 3 Standardized tests and measures addressing body structure, function, activity limitation and / or participation in recreation  ;Presentation HIGH Complexity : Unstable and unpredictable characteristics  ; Clinical Decision Making MEDIUM Complexity : FOTO score of 26-74  Overall Complexity Rating: MEDIUM  Problem List: pain affecting function, decrease ROM, decrease strength, edema affecting function, impaired gait/ balance, decrease ADL/ functional abilitiies, decrease activity tolerance, decrease flexibility/ joint mobility and decrease transfer abilities   Treatment Plan may include any combination of the following: Therapeutic exercise, Therapeutic activities, Neuromuscular re-education, Physical agent/modality, Gait/balance training, Manual therapy, Patient education, Self Care training, Functional mobility training, Home safety training and Stair training  Patient / Family readiness to learn indicated by: asking questions, trying to perform skills and interest  Persons(s) to be included in education: patient (P)  Barriers to Learning/Limitations: None  Patient Goal (s): get the pain down at least down to a 3/10.   Patient Self Reported Health Status: good  Rehabilitation Potential: good         Progress towards goals / Updated goals:  Short Term Goals: To be accomplished in 2 weeks:  1. Patient will be independent and compliant with HEP to progress toward goals and restore functional mobility. Eval Status: issued at [facility]    2. Patient will report pain no greater than a 6-7/10 in order to increase ease with completion of ADL's. Eval Status: pain at worst:10/10 pain at best is a 5/10      Long Term Goals: To be accomplished in 6 weeks:  1. Patient will improve FOTO score to 26 points to demonstrate improvement in functional status. Status at IE:58    Current: Same as IE    2. Pt will report the ability to ambulate for at least 30 minutes with pain less than or equal to 5/10 in order to improve ease with completion of grocery shopping. Eval Status: pt reports the ability to walk for 15-20 minutes and being in significant pain after that.      3.   Pt will improve strength by at least . 5 grade MMT in order to increase ease with lifting activities. Eval Status: Strength (MMT): 5/5 with exceptions indicated below. Hip Left (1-5) Right (1-5)   Hip Flexion 3+ pain 3 pain   Hip Extension( tested in s/l) 3+ 3- pain   Hip ABD 4 3 pain   Hip ADD 4- 4- incresaed right groin/ant hip pain   Hip ER  4+, increased right groin/ant hip pain   Hip IR increased left groin/ant hip pain 4- increased right groin/ant hip pain. 4.   Patient will improve pain in daily activities to no greater than 3/10 at worst to improve activity tolerance and restore prior level of function. Eval Status: 10/10 at worst    5. Patient will report the ability to sit for at least 1 hour with pain no greater than 5/10 in order to complete work duties with increased ease. Eval status: pt reports she is unable to sit more than 15-20 minute  Frequency / Duration: Patient to be seen 2 times per week for 6 weeks. Patient/ Caregiver education and instruction: Diagnosis, prognosis, self care, activity modification and exercises   [x]  Plan of care has been reviewed with PTA    Certification Period: SHANTELLE Mg, PT 12/29/2021 3:13 PM    ________________________________________________________________________    I certify that the above Therapy Services are being furnished while the patient is under my care. I agree with the treatment plan and certify that this therapy is necessary.     Physician's Signature:_____________________Date:____________TIME:________                                      Fabio Oneill MD      ** Signature, Date and Time must be completed for valid certification **  Please sign and return to [facility] at [facility]  [address], [address] [facility]  Ph [phone]  Fx [phone]

## 2021-12-29 NOTE — PROGRESS NOTES
PT DAILY TREATMENT NOTE/Hip Evaluation    Patient Name: Kamilah Olivarez  Date:2021  : 1995  [x]  Patient  Verified  Payor: [address] / Plan: [address] / Product Type: Managed Care Medicaid /    In time:11:45  Out time:12:32  Total Treatment Time (min): 52  Visit #: 1 of 12      Treatment Area: Left hip pain [M25.552]  Right hip pain [M25.551]      SUBJECTIVE  Pain Level (0-10 scale): 8/10  [x]constant []intermittent []improving [x]worsening []no change since onset  Any medication changes, allergies to medications, adverse drug reactions, diagnosis change, or new procedure performed?: [x] No    [] Yes (see summary sheet for update)  Subjective functional status/changes:   HPI: Pt reports she is 25 weeks pregnant and she started to have increased pain 3 weeks ago. Pt reports she normally has bladder and pelvic pain but this pain is different. Pt reports she has sciatic pain before because of her bladder pain. Hard to sleep on her sides and that is hard. Pt reports her bladder pain is usually at a 4-5 but this is a different type of pain she was at a 0/10 with this type of pain prior. Pt with reports of increased pain in the low back on the right side. Pt denies numbness and tingling. Pt denies any new changes in bowl/bladder. Pain description:    [x]Constant []Intermittent, shooting pain down her leg like sciatica, more dull and achy. Feels like her right hip gets stuck until it gets back in place. Limitations to PLOF: lifting heavy baskets of laundry, bringing up groceries. Going grocery shopping. Unable to walk more than 15-20 minutes, unable to stand more than 15-20 min. Unable to cook she has to take out.    Mechanism of Injury: pregnancy  Current symptoms/Complaints: rest: 8/10, 5/10 at best with unable to find anything right now, but maybe laying down; 10/10 at worst with walking/standing or sitting for too long when she moves both of her hips (right more than left); pt reports they are unable to sleep through the night secondary to pain  Pain since onset: []Better [x]worse  Previous Treatment/Compliance: not for this pain, but has had pelvic floor for her interstitial cystitis. Comorbidities/PMHx/Surgical Hx:   [x]Other: interstitial cystitis dx in 2014. First pregnancy- at 25 weeks right now: due date second week April    Prior level of function: functionally independent, no AD, moderately active lifestyle  Work Hx:  2 jobs: she  she is sitting that is hard and also  and she is sitting with that. Living Situation: lives with partner, one story first floor, 2 steps to get in. Pt Goals: \"get the pain down at least down to a 3/10. \"  Barriers: []pain []financial []time []transportation [x]other-pregnancy and interstitial cystitis. OBJECTIVE      39 min [x]Eval                  []Re-Eval       8 no charge min Therapeutic Exercise:  [] See flow sheet :   Rationale: increase ROM, increase strength and increase proprioception to improve the patients ability to To increase patient's ease with performing functional activities and decrease overall pain and symptoms. With   [x] TE   [] TA   [] neuro   [] other: Patient Education: [x] Review HEP    [] Progressed/Changed HEP based on:   [] positioning   [] body mechanics   [] transfers   [] heat/ice application    [x] other: pt was educated to look into getting an exercise ball       General Evaluation    Gait: pt ambulates with slow gait pattern with decreased step length bilaterally,   Stairs:nt  Posture: pt stands with increased lumbar lordosis. Palpation/Sensation: Pt with significant tenderness to palpation over bilateral PSIS (right > left) increased over bilateral gluteal region, increased over bilateral greater trochanters increased over proximal bilateral hips/hip flexor/adductors, proximal rectus femoris. Intact to light touch in bilateral LE's.     ROM: (degrees)    Lumbar: flexion: 28 cm increased pain. Ext: limited increased pain, lateral flexion: 5 cm of translation bilaterally increased pain. Rotation: L: 25% increased pain, R: 25% increased pain. AROM    PROM   Knee Left Right Left Right   Extension       Flexion                 AROM                           PROM  Hip Left Right Left Right   Flexion limited limited     Extension  limited     ABD limited limited     ER limited limited     IR limited limited                                Strength (MMT): 5/5 with exceptions indicated below. Hip Left (1-5) Right (1-5)   Hip Flexion 3+ pain 3 pain   Hip Extension( tested in s/l) 3+ 3- pain   Hip ABD 4 3 pain   Hip ADD 4- 4- incresaed right groin/ant hip pain   Hip ER  4+, increased right groin/ant hip pain   Hip IR increased left groin/ant hip pain 4- increased right groin/ant hip pain. Knee Left (1-5) Right (1-5)   Knee Flexion  3+   Knee Extension     Ankle PF     Ankle DF nt nt   Functional Mobility      Bed Mobility:      Scooting: modified independent. Rolling: modified independence, increased pain. Sit-Supine:modified independence, increased pain. Transfers:       Sit-Stand:modified independence, increased pain          Special Tests:  Patient was in significant pain, was unable to get accurate reading on most special tests due to every movement causing increased pain. SLR: right: positive for low back and radicular symptoms, Left: negative for radicular symptoms, increased left low back pain. Slump:negative  Distracted SLR: right: uncomfortable, left: negative  90/90: positive bilaterally. Pain Level (0-10 scale) post treatment: 8/10    ASSESSMENT/Changes in Function: Patient is a 22 yo female who presents to [facility] with c/o bilateral hip pain/low back and pelvic pain.  Patient is currently 25 weeks pregnant and reports pain started 3 weeks ago. Patient s/s are consistent with sacroiliac joint pain, mechanical low back pain, and bilateral anterior hip pain. Patient reports laying down decreases the pain and sitting/standing and walking increase the pain. Patient demonstrates decreased ROM, decreased strength, impaired gait and pain which limits ease with functional activities and mobility. Patient would benefit from skilled physical therapy to address the above limitations. Patient will continue to benefit from skilled PT services to modify and progress therapeutic interventions, address functional mobility deficits, address ROM deficits, address strength deficits, analyze and address soft tissue restrictions, analyze and cue movement patterns, analyze and modify body mechanics/ergonomics and assess and modify postural abnormalities to attain remaining goals. [x]  See Plan of Care  []  See progress note/recertification  []  See Discharge Summary    Evaluation Complexity History MEDIUM  Complexity : 1-2 comorbidities / personal factors will impact the outcome/ POC ; Examination MEDIUM Complexity : 3 Standardized tests and measures addressing body structure, function, activity limitation and / or participation in recreation  ;Presentation HIGH Complexity : Unstable and unpredictable characteristics  ; Clinical Decision Making MEDIUM Complexity : FOTO score of 26-74  Overall Complexity Rating: MEDIUM  Problem List: pain affecting function, decrease ROM, decrease strength, edema affecting function, impaired gait/ balance, decrease ADL/ functional abilitiies, decrease activity tolerance, decrease flexibility/ joint mobility and decrease transfer abilities   Treatment Plan may include any combination of the following: Therapeutic exercise, Therapeutic activities, Neuromuscular re-education, Physical agent/modality, Gait/balance training, Manual therapy, Patient education, Self Care training, Functional mobility training, Home safety training and Stair training  Patient / Family readiness to learn indicated by: asking questions, trying to perform skills and interest  Persons(s) to be included in education: patient (P)  Barriers to Learning/Limitations: None  Patient Goal (s): get the pain down at least down to a 3/10.   Patient Self Reported Health Status: good  Rehabilitation Potential: good         Progress towards goals / Updated goals:  Short Term Goals: To be accomplished in 2 weeks:  1. Patient will be independent and compliant with HEP to progress toward goals and restore functional mobility. Eval Status: issued at [facility]    2. Patient will report pain no greater than a 6-7/10 in order to increase ease with completion of ADL's. Eval Status: pain at worst:10/10 pain at best is a 5/10      Long Term Goals: To be accomplished in 6 weeks:  1. Patient will improve FOTO score to 26 points to demonstrate improvement in functional status. Status at IE:58    Current: Same as IE    2. Pt will report the ability to ambulate for at least 30 minutes with pain less than or equal to 5/10 in order to improve ease with completion of grocery shopping. Eval Status: pt reports the ability to walk for 15-20 minutes and being in significant pain after that. 3.   Pt will improve strength by at least . 5 grade MMT in order to increase ease with lifting activities. Eval Status: Strength (MMT): 5/5 with exceptions indicated below. Hip Left (1-5) Right (1-5)   Hip Flexion 3+ pain 3 pain   Hip Extension( tested in s/l) 3+ 3- pain   Hip ABD 4 3 pain   Hip ADD 4- 4- incresaed right groin/ant hip pain   Hip ER  4+, increased right groin/ant hip pain   Hip IR increased left groin/ant hip pain 4- increased right groin/ant hip pain. 4.   Patient will improve pain in daily activities to no greater than 3/10 at worst to improve activity tolerance and restore prior level of function. Eval Status: 10/10 at worst    5. Patient will report the ability to sit for at least 1 hour with pain no greater than 5/10 in order to complete work duties with increased ease. Eval status: pt reports she is unable to sit more than 15-20 minutes.             PLAN  [x]  Upgrade activities as tolerated     [x]  Continue plan of care  [x]  Update interventions per flow sheet       []  Discharge due to:_  []  Other:_      Claire Hoover, PT 12/29/2021  11:18 AM

## 2022-01-07 ENCOUNTER — HOSPITAL ENCOUNTER (EMERGENCY)
Age: 27
Discharge: STILL ADMITTED OR ADMINISTRATIVE | End: 2022-01-07
Attending: EMERGENCY MEDICINE

## 2022-01-07 ENCOUNTER — HOSPITAL ENCOUNTER (OUTPATIENT)
Age: 27
Discharge: HOME | End: 2022-01-07
Attending: OBSTETRICS & GYNECOLOGY | Admitting: OBSTETRICS & GYNECOLOGY
Payer: COMMERCIAL

## 2022-01-07 VITALS
HEIGHT: 62 IN | BODY MASS INDEX: 38.64 KG/M2 | HEART RATE: 76 BPM | WEIGHT: 210 LBS | DIASTOLIC BLOOD PRESSURE: 65 MMHG | RESPIRATION RATE: 16 BRPM | SYSTOLIC BLOOD PRESSURE: 121 MMHG | TEMPERATURE: 98.5 F

## 2022-01-07 PROBLEM — M54.9 BACK PAIN: Status: ACTIVE | Noted: 2022-01-07

## 2022-01-07 PROBLEM — Z3A.27 27 WEEKS GESTATION OF PREGNANCY: Status: ACTIVE | Noted: 2022-01-07

## 2022-01-07 PROBLEM — R10.2 PELVIC PAIN: Status: ACTIVE | Noted: 2022-01-07

## 2022-01-07 PROBLEM — N30.10 INTERSTITIAL CYSTITIS: Status: ACTIVE | Noted: 2022-01-07

## 2022-01-07 PROBLEM — Z34.90 PREGNANCY: Status: ACTIVE | Noted: 2022-01-07

## 2022-01-07 LAB
APPEARANCE UR: ABNORMAL
BILIRUB UR QL: NEGATIVE
COLOR UR: ABNORMAL
FIBRONECTIN FETAL VAG QL: NEGATIVE
GLUCOSE UR QL STRIP.AUTO: NEGATIVE MG/DL
KETONES UR-MCNC: NEGATIVE MG/DL
LEUKOCYTE ESTERASE UR QL STRIP: ABNORMAL
NITRITE UR QL: NEGATIVE
PH UR: 7 [PH] (ref 5–9)
PROT UR QL: ABNORMAL MG/DL
RBC # UR STRIP: NEGATIVE /UL
SERVICE CMNT-IMP: ABNORMAL
SP GR UR: 1.02 (ref 1–1.02)
UROBILINOGEN UR QL: 0.2 EU/DL (ref 0.2–1)

## 2022-01-07 PROCEDURE — 81003 URINALYSIS AUTO W/O SCOPE: CPT

## 2022-01-07 PROCEDURE — 82731 ASSAY OF FETAL FIBRONECTIN: CPT

## 2022-01-07 PROCEDURE — 99282 EMERGENCY DEPT VISIT SF MDM: CPT

## 2022-01-07 PROCEDURE — 74011250637 HC RX REV CODE- 250/637: Performed by: OBSTETRICS & GYNECOLOGY

## 2022-01-07 RX ORDER — ACETAMINOPHEN 500 MG
1000 TABLET ORAL ONCE
Status: COMPLETED | OUTPATIENT
Start: 2022-01-07 | End: 2022-01-07

## 2022-01-07 RX ADMIN — ACETAMINOPHEN 1000 MG: 500 TABLET ORAL at 10:24

## 2022-01-07 NOTE — H&P
Ostetrical History and Physical    Subjective:     Date of Admission: 2022    Patient is a 23 y.o.   female admitted with pelvic pain, acute on chronic, and back pain. H/o interstitial cystitis. Recent abx. For Obstetric history, see prenantal.    For Review of Systems, see prenatal    Past Medical History:   Diagnosis Date    Abnormal Papanicolaou smear of cervix     2018, 2019    Interstitial cystitis     Interstitial cystitis     Nicotine vapor product user       Past Surgical History:   Procedure Laterality Date    HX REFRACTIVE SURGERY        Prior to Admission medications    Medication Sig Start Date End Date Taking? Authorizing Provider   prenatal multivit-ca-min-fe-fa (Prenatal Vitamin) tab Take 1 Tablet by mouth daily. 21  Yes CRISTINO [place]   levonorgestreL (Mirena) 20 mcg/24 hours (5 yrs) 52 mg IUD 1 Device by IntraUTERine route once. Patient not taking: Reported on 2022    Other, MD James     No Known Allergies   Social History     Tobacco Use    Smoking status: Never Smoker    Smokeless tobacco: Never Used   Substance Use Topics    Alcohol use: Not Currently     Comment: social      No family history on file. Objective:     Blood pressure (!) 115/59, pulse 83, temperature 98.5 °F (36.9 °C), resp. rate 16, height 5' 2\" (1.575 m), weight 95.3 kg (210 lb), last menstrual period 2021. Temp (24hrs), Av.5 °F (36.9 °C), Min:98.5 °F (36.9 °C), Max:98.5 °F (36.9 °C)        No intake/output data recorded. No intake/output data recorded. HEENT: No pallor, no jaundice, Thyroid and throat normal  RESPIRATORY: Clear to A & P  CVS: pulse reg, HS normal  ABDOMEN: Gravid. Vertex. FH=27wks. No abnormal tenderness.    Pelvic: Cervix 0, (by RN)  Effaced: 0%  Station:  -2  Data Review:   Recent Results (from the past 24 hour(s))   POC URINE MACROSCOPIC    Collection Time: 22  8:56 AM   Result Value Ref Range    Color DARK YELLOW      Appearance SLIGHTLY CLOUDY Spec. gravity (POC) 1.020 1.001 - 1.023      pH, urine  (POC) 7.0 5.0 - 9.0      Protein (POC) TRACE (A) NEG mg/dL    Glucose, urine (POC) Negative NEG mg/dL    Ketones (POC) Negative NEG mg/dL    Bilirubin (POC) Negative NEG      Blood (POC) Negative NEG      Urobilinogen (POC) 0.2 0.2 - 1.0 EU/dL    Nitrite (POC) Negative NEG      Leukocyte esterase (POC) SMALL (A) NEG      Performed by Ani Welch (Chaya)    FETAL FIBRONECTIN    Collection Time: 01/07/22 10:27 AM   Result Value Ref Range    Fetal fibronectin Negative NEG       Monitor:  Reactivity:present Variability:present Baseline:within normal limits    Assessment:     Principal Problem:    Pelvic pain (1/7/2022)    Active Problems:    Pregnancy (1/7/2022)      27 weeks gestation of pregnancy (1/7/2022)      Interstitial cystitis (1/7/2022)      Back pain (1/7/2022)        Plan:   FFN neg, UA mildly pos. Recent abx, treat with pyridium  F/U  1 wk       Check labs:  Urinalysis  Check  Prenatal:    Disposition:     Type of admit:Out Paitent    I saw and examined patient.     Signed By: Jennifer Oconnell MD                         January 7, 2022

## 2022-01-07 NOTE — PROGRESS NOTES
5650: Pt is a 26y. o.  at 27w 1d, presenting to L&D triage with complaints of constant lower abdominal pain and headache. Pt denies vaginal bleeding or blurred vision. EFM and Blakely applied.

## 2022-01-07 NOTE — PROGRESS NOTES
[address]- Spoke with Dr. Kolton Germain, made aware of patient, orders given    1024- PO fluids and snack given to patient, FFN collected     1222- Dr. 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

## 2022-01-20 ENCOUNTER — HOSPITAL ENCOUNTER (OUTPATIENT)
Dept: PHYSICAL THERAPY | Age: 27
Discharge: HOME | End: 2022-01-20
Payer: COMMERCIAL

## 2022-01-20 PROCEDURE — 97110 THERAPEUTIC EXERCISES: CPT

## 2022-01-20 PROCEDURE — 97112 NEUROMUSCULAR REEDUCATION: CPT

## 2022-01-20 PROCEDURE — 97530 THERAPEUTIC ACTIVITIES: CPT

## 2022-01-20 NOTE — PROGRESS NOTES
PT DAILY TREATMENT NOTE    Patient Name: Chandra Sanders  Date:2022  : 1995  [x]  Patient  Verified  [facility] [address] / Plan: [address] / Product Type: Managed Care Medicaid /    In time:0800  Out EDQO:0364  Total Treatment Time (min): 38  Total Timed Codes (min): 38  1:1 Treatment Time (MC/BCBS only): 38   Visit #: 2 of 12    Treatment Dx: Left hip pain [M25.552]  Right hip pain [M25.551]    SUBJECTIVE  Pain Level (0-10 scale): 4  Any medication changes, allergies to medications, adverse drug reactions, diagnosis change, or new procedure performed?: [x] No    [] Yes (see summary sheet for update)  Subjective functional status/changes:   [] No changes reported  Pt reports she has been hurting but has been looking forward to getting into PT.    OBJECTIVE    15 min Therapeutic Exercise:  [x] See flow sheet :   Rationale: increase ROM and increase strength to improve the patients ability to restore PLOF     15 min Therapeutic Activity:  [x]  See flow sheet :   Rationale: increase strength and improve coordination  to improve the patients ability to complete transfers and ADLs without external assist      8 min Neuromuscular Re-education:  [x]  See flow sheet :   Rationale: improve coordination and increase proprioception  to improve the patients ability to activate TA without compensation           With   [x] TE   [x] TA   [x] neuro   [] other: Patient Education: [x] Review HEP    [] Progressed/Changed HEP based on:   [] positioning   [] body mechanics   [] transfers   [] heat/ice application    [] other:      Other Objective/Functional Measures: NA     Pain Level (0-10 scale) post treatment: 4    ASSESSMENT/Changes in Function: Patient tolerated treatment session fair today. Patient had no complaints with addition of SB rocks, circles and gentle PF and TA activation to exercise program to accomplish improved pelvic and hip stability.   Patient continues to make fair progress toward goals and would benefit from continued skilled PT intervention to address remaining deficits outlined in goals below. Patient will continue to benefit from skilled PT services to modify and progress therapeutic interventions, address functional mobility deficits, address ROM deficits, address strength deficits, analyze and address soft tissue restrictions, analyze and cue movement patterns, analyze and modify body mechanics/ergonomics and assess and modify postural abnormalities to attain remaining goals. [x]  See Plan of Care  []  See progress note/recertification  []  See Discharge Summary         Progress towards goals / Updated goals:  Short Term Goals: To be accomplished in 2 weeks:  1. Patient will be independent and compliant with HEP to progress toward goals and restore functional mobility. Eval Status: issued at eval  Current: advised pt get a SB for use as a chair at her home office to relieve some pressure and pain 1/20/22     2. Patient will report pain no greater than a 6-7/10 in order to increase ease with completion of ADL's. Eval Status: pain at worst:10/10 pain at best is a 5/10        Long Term Goals: To be accomplished in 6 weeks:  1. Patient will improve FOTO score to 26 points to demonstrate improvement in functional status. Status at IE:58                    Current: Same as IE     2.   Pt will report the ability to ambulate for at least 30 minutes with pain less than or equal to 5/10 in order to improve ease with completion of grocery shopping. Eval Status: pt reports the ability to walk for 15-20 minutes and being in significant pain after that.      3. Pt will improve strength by at least . 5 grade MMT in order to increase ease with lifting activities. Eval Status: Strength (MMT): 5/5 with exceptions indicated below.                                              Hip Left (1-5) Right (1-5)   Hip Flexion 3+ pain 3 pain   Hip Extension( tested in s/l) 3+ 3- pain   Hip ABD 4 3 pain   Hip ADD 4- 4- incresaed right groin/ant hip pain   Hip ER   4+, increased right groin/ant hip pain   Hip IR increased left groin/ant hip pain 4- increased right groin/ant hip pain.          4. Patient will improve pain in daily activities to no greater than 3/10 at worst to improve activity tolerance and restore prior level of function. Eval Status: 10/10 at worst     5. Patient will report the ability to sit for at least 1 hour with pain no greater than 5/10 in order to complete work duties with increased ease.   Eval status: pt reports she is unable to sit more than 15-20 minutes.        PLAN  [x]  Upgrade activities as tolerated     [x]  Continue plan of care  [x]  Update interventions per flow sheet       []  Discharge due to:_  []  Other:_      [place] Hagen, PT 1/20/2022  8:43 AM    Future Appointments   Date Time Provider Thuan Shrestha   1/24/2022  2:45 PM Lindi Goltz, PT [facility]   1/27/2022  8:45 AM Mitchel Novoa [facility]

## 2022-01-24 ENCOUNTER — HOSPITAL ENCOUNTER (OUTPATIENT)
Dept: PHYSICAL THERAPY | Age: 27
Discharge: HOME | End: 2022-01-24
Payer: COMMERCIAL

## 2022-01-24 PROCEDURE — 97112 NEUROMUSCULAR REEDUCATION: CPT

## 2022-01-24 PROCEDURE — 97535 SELF CARE MNGMENT TRAINING: CPT

## 2022-01-24 PROCEDURE — 97110 THERAPEUTIC EXERCISES: CPT

## 2022-01-24 NOTE — PROGRESS NOTES
PT DAILY TREATMENT NOTE    Patient Name: Ketty Bean  Date:2022  : 1995  [x]  Patient  Verified  Payor: [address] [place] [address] Road / Plan: [address] / Product Type: Managed Care Medicaid /    In time: 250 Out time:330  Total Treatment Time (min): 40  Total Timed Codes (min): 40  1:1 Treatment Time (MC/BCBS only): 40   Visit #: 3 of 12    Treatment Dx: Left hip pain [M25.552]  Right hip pain [M25.551]    SUBJECTIVE  Pain Level (0-10 scale): 5/10  Any medication changes, allergies to medications, adverse drug reactions, diagnosis change, or new procedure performed?: [x] No    [] Yes (see summary sheet for update)  Subjective functional status/changes:   [] No changes reported  Patient reports having ordered a swiss ball. OBJECTIVE    22 min Therapeutic Exercise:  [x] See flow sheet :   Rationale: increase ROM, increase strength and improve coordination to improve the patients ability to tolerate activities of daily living. 10 min Neuromuscular Re-education:  []  See flow sheet : instructed on trigger point releases  skin rolling   Rationale: increase ROM and patient was instructed on how to use kinesiotape and manual techniques to allow her to isntruct her partner on how to help her decrease pain  to improve the patients ability to perform activities of daily living. 8 min Self Care:  [x] See flow sheet :  HEP for pubic symphysis disorder   Rationale: increase ROM, increase strength and improve coordination to improve the patients ability to tolerate activities of daily living. With   [] TE   [] TA   [] neuro   [] other: Patient Education: [x] Review HEP    [] Progressed/Changed HEP based on:   [] positioning   [] body mechanics   [] transfers   [] heat/ice application    [] other:           Pain Level (0-10 scale) post treatment: 5/10\" but slight less of a five\"    ASSESSMENT/Changes in Function: Patient tolerated treatment session well today. Patient had no complaints with addition of instruction of manual techniques and kinesiotaping to SI to enable her to instruct her partner on how to decrease her pain. Patient continues to make slow  progress toward goals and would benefit from continued skilled PT intervention to address remaining deficits outlined in goals below. Patient will continue to benefit from skilled PT services to modify and progress therapeutic interventions, address functional mobility deficits, address ROM deficits, address strength deficits, analyze and address soft tissue restrictions, analyze and cue movement patterns and analyze and modify body mechanics/ergonomics to attain remaining goals. [x]  See Plan of Care  []  See progress note/recertification  []  See Discharge Summary         Progress towards goals / Updated goals:  Short Term Goals: To be accomplished in 2 weeks:  1. Patient will be independent and compliant with HEP to progress toward goals and restore functional mobility. Eval Status: issued at eval  Current: advised pt get a SB for use as a chair at her home office to relieve some pressure and pain 1/20/22  Current:  Patient has ordered a SB 1/24/22     2. Patient will report pain no greater than a 6-7/10 in order to increase ease with completion of ADL's. Eval Status: pain at worst:10/10 pain at best is a 5/10  Current:  Pain at worst 9/10 and best 5/10  1/24/22        Long Term Goals: To be accomplished in 6 weeks:  1. Patient will improve FOTO score to 26 points to demonstrate improvement in functional status.                     Status at IE:58                    Current: Same as IE     2.   Pt will report the ability to ambulate for at least 30 minutes with pain less than or equal to 5/10 in order to improve ease with completion of grocery shopping.   Eval Status: pt reports the ability to walk for 15-20 minutes and being in significant pain after that.      3.   Pt will improve strength by at least . 5 grade MMT in order to increase ease with lifting activities. Eval Status: Strength (MMT): 5/5 with exceptions indicated below.                                             Hip Left (1-5) Right (1-5)   Hip Flexion 3+ pain 3 pain   Hip Extension( tested in s/l) 3+ 3- pain   Hip ABD 4 3 pain   Hip ADD 4- 4- incresaed right groin/ant hip pain   Hip ER   4+, increased right groin/ant hip pain   Hip IR increased left groin/ant hip pain 4- increased right groin/ant hip pain.          4.   Patient will improve pain in daily activities to no greater than 3/10 at worst to improve activity tolerance and restore prior level of function. Eval Status: 10/10 at worst     5. Patient will report the ability to sit for at least 1 hour with pain no greater than 5/10 in order to complete work duties with increased ease.   Eval status: pt reports she is unable to sit more than 15-20 minutes.              PLAN  []  Upgrade activities as tolerated     [x]  Continue plan of care  []  Update interventions per flow sheet       []  Discharge due to:_  []  Other:_      Janus Hodgkins, PT 1/24/2022  2:58 PM    Future Appointments   Date Time Provider Thuan Shrestha   1/27/2022  8:45 AM [facility]

## 2022-01-27 ENCOUNTER — HOSPITAL ENCOUNTER (OUTPATIENT)
Dept: PHYSICAL THERAPY | Age: 27
Discharge: HOME | End: 2022-01-27
Payer: COMMERCIAL

## 2022-01-27 PROCEDURE — 97535 SELF CARE MNGMENT TRAINING: CPT

## 2022-01-27 PROCEDURE — 97110 THERAPEUTIC EXERCISES: CPT

## 2022-01-27 PROCEDURE — 97530 THERAPEUTIC ACTIVITIES: CPT

## 2022-01-27 NOTE — PROGRESS NOTES
[facility]  [address], [address] [facility]  Ph [phone]  Fx [phone]    Physical Therapy Progress Note  Patient name: Benjamin Turk Start of Care: 2021   Referral source: Fabio Oneill MD : 1995                Medical Diagnosis: Left hip pain [M25.552]  Right hip pain [M25.551]    Onset Date:21                Treatment Diagnosis: left and right hip pain, SIJ                                              ICD-10: M25.552, M25.551   Prior Hospitalization: see medical history Provider#: [id number]   Medications: Verified on Patient summary List   Comorbidities/PMHx/Surgical Hx:   [x]? ? Other: interstitial cystitis dx in . First pregnancy- at 25 weeks right now: due date second week  level of function: functionally independent, no AD, moderately active lifestyle                                  Visits from Start of Care: 4    Missed Visits: 1    Updated Goals/Measure of Progress: To be achieved in 8 weeks:  Short Term Goals: To be accomplished in 2 weeks:  1. Patient will be independent and compliant with HEP to progress toward goals and restore functional mobility. Eval Status: issued at eval  Current: advised pt get a SB for use as a chair at her home office to relieve some pressure and pain 22  Current:  Patient has ordered a SB 22  Current:  Patient reported that she received the Swiss ball yesterday. She has not used it for the HEP yet. 2022 Progressing     2. Patient will report pain no greater than a 6-7/10 in order to increase ease with completion of ADL's. Eval Status: pain at worst:10/10 pain at best is a 5/10  Current:  Pain at worst 9/10 and best 5/10  22  Current:  Pain at worst 8/10 when she was trying to get comfortable in bed and best 4/10 2022 progressing        Long Term Goals: To be accomplished in 8 weeks:  1.  Patient will improve FOTO score to 26 points to demonstrate improvement in functional status.                   Status at IE:58                    Current: Same as IE                     Current: Will defer until the 5th visit     2.   Pt will report the ability to ambulate for at least 30 minutes with pain less than or equal to 5/10 in order to improve ease with completion of grocery shopping.   Eval Status: pt reports the ability to walk for 15-20 minutes and being in significant pain after that.    Current:  Pt reports the ability to ambulate 15-20 minutes before having significant pain. 1/27/2022     3.   Pt will improve strength by at least . 5 grade MMT in order to increase ease with lifting activities. Eval Status: Strength (MMT): 5/5 with exceptions indicated below.                                             Hip Left (1-5) Right (1-5) Left   1/27/2022 Right   1/27/2022   Hip Flexion 3+ pain 3 pain 4 pain 4   Hip Extension( tested in s/l) 3+ 3- pain 3 pain     Hip ABD 4 3 pain 3 pain 3   Hip ADD 4- 4- increased right groin/ant hip pain 4-  pain 4- pain   Hip ER   4+, increased right groin/ant hip pain 5  No pain 5  No pain   Hip IR increased left groin/ant hip pain 4- increased right groin/ant hip pain.  4-  No pain 4-  No pain         4.   Patient will improve pain in daily activities to no greater than 3/10 at worst to improve activity tolerance and restore prior level of function. Eval Status: 10/10 at worst  Current:  Pain at worst 8/10 when she was trying to get comfortable in bed and best 4/10 1/27/2022 progressing        5. Patient will report the ability to sit for at least 1 hour with pain no greater than 5/10 in order to complete work duties with increased ease.   Eval status: pt reports she is unable to sit more than 15-20 minutes.   Current:  Patient reports being able to tolerate sitting on the swiss ball for more than 1 hour with pain no greater than 5/10  1/27/2022 Goal Met       Summary of Care/ Key Functional Changes: Patient is a 22 y/o female who was referred to physical therapy for pubic symphysis and SI joint pain. Patient has attended 4 therapy sessions. She reports a slight decrease in pain overall and a slight  increase in functional ability. She has not performed her HEP with consistency due to being fatigued. Patient may benefit from continued physical therapy to manage her symptoms as the pregnancy progresses.         ASSESSMENT/RECOMMENDATIONS:  [x]Continue therapy per initial plan/protocol at a frequency of  1 x per week for 8 weeks  []Continue therapy with the following recommended changes:_____________________ _____________________________ ________________________________________  []Discontinue therapy progressing towards or have reached established goals  []Discontinue therapy due to lack of appreciable progress towards goals  []Discontinue therapy due to lack of attendance or compliance  []Await Physician's recommendations/decisions regarding therapy  []Other:________________________________________________________________    Thank you for this referral.   Leandro Miller, PT 1/27/2022 4:28 PM

## 2022-01-27 NOTE — PROGRESS NOTES
PT DAILY TREATMENT NOTE    Patient Name: Celeste Sanchez  Date:2022  : 1995  [x]  Patient  Verified  Payor: [address] [place] [address] Road / Plan: [address] / Product Type: Managed Care Medicaid /    In time: 920  Out time:410  Total Treatment Time (min): 45  Total Timed Codes (min): 45  1:1 Treatment Time ( [address] only): 39   Visit #: 4 of 12    Treatment Area: Left hip pain [M25.552]  Right hip pain [M25.551]    SUBJECTIVE  Pain Level (0-10 scale): 410  Any medication changes, allergies to medications, adverse drug reactions, diagnosis change, or new procedure performed?: [x] No    [] Yes (see summary sheet for update)  Subjective functional status/changes:   [] No changes reported  Patient reports that she received her swiss ball and has been using it to sit on but has not performed the HEP yet due to being too tired. OBJECTIVE    20 min Therapeutic Exercise:  [x] See flow sheet :   Rationale: Inhibit abnormal muscle activity and Improve lumbosacral and coccygeal mobility in order to decrease pubic symphysis pain. 15 min Self Care: Thorough review and handout provided on the following: Instructed on positioning, kinesiotaping for abdomen,  and STM for her partner    Rationale:  Increase awareness and understanding of current condition to improve patients ability to independently and effectively attain goals and progress towards long term management of current condition. 10 min Therapeutic Activity:  []  See flow sheet :reassessed goals   Rationale: Improve lumbosacral and coccygeal mobility and determine the efficacy of the treatments in order to Improve ability to perform ADLs.                  With   [] TE   [] TA   [] neuro   [] other: Patient Education: [x] Review HEP    [] Progressed/Changed HEP based on:   [] positioning   [] body mechanics   [] transfers   [] heat/ice application    [] other:           Pain Level (0-10 scale) post treatment: 2/10    ASSESSMENT/Changes in Function: Patient tolerated today's session well with no c/o pain. Patient demonstrated understanding of today's instruction, education, and recommendations. Patient is progressing appropriately towards goals. Patient is 29 weeks into her pregnancy and continues to have pubic symphysis pain. She has purchased a swiss ball and reports being able to sit on this and do her work with reduced pain. Patient will continue to benefit from skilled PT services to modify and progress therapeutic interventions, address functional mobility deficits, address ROM deficits, address strength deficits, analyze and address soft tissue restrictions, analyze and cue movement patterns and analyze and modify body mechanics/ergonomics to attain remaining goals. [x]  See Plan of Care  []  See progress note/recertification  []  See Discharge Summary         Progress towards goals / Updated goals:  Short Term Goals: To be accomplished in 2 weeks:  1. Patient will be independent and compliant with HEP to progress toward goals and restore functional mobility. Eval Status: issued at [facility]  Current: advised pt get a SB for use as a chair at her home office to relieve some pressure and pain 1/20/22  Current:  Patient has ordered a SB 1/24/22  Current:  Patient reported that she received the Swiss ball yesterday. She has not used it for the HEP yet. 1/27/2022 Progressing     2. Patient will report pain no greater than a 6-7/10 in order to increase ease with completion of ADL's. Eval Status: pain at worst:10/10 pain at best is a 5/10  Current:  Pain at worst 9/10 and best 5/10  1/24/22  Current:  Pain at worst 8/10 when she was trying to get comfortable in bed and best 4/10 1/27/2022 progressing        Long Term Goals: To be accomplished in 8 weeks:  1. Patient will improve FOTO score to 26 points to demonstrate improvement in functional status.                     Status at IE:58                    Current: Same as IE                     Current: Will defer until the 5th visit     2.   Pt will report the ability to ambulate for at least 30 minutes with pain less than or equal to 5/10 in order to improve ease with completion of grocery shopping.   Eval Status: pt reports the ability to walk for 15-20 minutes and being in significant pain after that.    Current:  Pt reports the ability to ambulate 15-20 minutes before having significant pain. 1/27/2022    3.   Pt will improve strength by at least . 5 grade MMT in order to increase ease with lifting activities. Eval Status: Strength (MMT): 5/5 with exceptions indicated below.                                             Hip Left (1-5) Right (1-5) Left   1/27/2022 Right   1/27/2022   Hip Flexion 3+ pain 3 pain 4 pain 4   Hip Extension( tested in s/l) 3+ 3- pain 3 pain    Hip ABD 4 3 pain 3 pain 3   Hip ADD 4- 4- increased right groin/ant hip pain 4-  pain 4- pain   Hip ER   4+, increased right groin/ant hip pain 5  No pain 5  No pain   Hip IR increased left groin/ant hip pain 4- increased right groin/ant hip pain.  4-  No pain 4-  No pain         4.   Patient will improve pain in daily activities to no greater than 3/10 at worst to improve activity tolerance and restore prior level of function. Eval Status: 10/10 at worst  Current:  Pain at worst 8/10 when she was trying to get comfortable in bed and best 4/10 1/27/2022 progressing        5. Patient will report the ability to sit for at least 1 hour with pain no greater than 5/10 in order to complete work duties with increased ease.   Eval status: pt reports she is unable to sit more than 15-20 minutes.   Current:  Patient reports being able to tolerate sitting on the swiss ball for more than 1 hour with pain no greater than 5/10  1/27/2022 Goal Met              PLAN  []  Upgrade activities as tolerated     [x]  Continue plan of care  [x]  Update interventions per flow sheet       [] Discharge due to:_  []  Other:_      Yamini Cleaning, PT 1/27/2022  3:29 PM    Future Appointments   Date Time Provider Thuan Shrestha   1/27/2022  3:30 PM [place] Dsouza, [facility]

## 2022-02-04 ENCOUNTER — HOSPITAL ENCOUNTER (OUTPATIENT)
Dept: PHYSICAL THERAPY | Age: 27
Discharge: HOME | End: 2022-02-04
Payer: COMMERCIAL

## 2022-02-04 PROCEDURE — 97530 THERAPEUTIC ACTIVITIES: CPT

## 2022-02-04 PROCEDURE — 97535 SELF CARE MNGMENT TRAINING: CPT

## 2022-02-04 PROCEDURE — 97110 THERAPEUTIC EXERCISES: CPT

## 2022-02-04 NOTE — PROGRESS NOTES
PT DAILY TREATMENT NOTE    Patient Name: Nay Shaver  ZUMQ:[id number]  : 1995  [x]  Patient  Verified  Payor: [address] [place] [address] Road / Plan: [address] / Product Type: Managed Care Medicaid /    In time:933  Out time: 2380  Total Treatment Time (min): 42  Total Timed Codes (min): 42  1:1 Treatment Time ( [address] only): 42   Visit #: 5 of 12    Treatment Area: Left hip pain [M25.552]  Right hip pain [M25.551]    SUBJECTIVE  Pain Level (0-10 scale): 4/10  Any medication changes, allergies to medications, adverse drug reactions, diagnosis change, or new procedure performed?: [x] No    [] Yes (see summary sheet for update)  Subjective functional status/changes:   [] No changes reported  Patient reports: good compliance with current HEP. Patient reports trying to teach her partner the techniques to help her decrease pain but he is a espinal and his hands are tired after work. OBJECTIVE    22 min Therapeutic Exercise:  [x] See flow sheet :   Rationale: Increase core strength, Inhibit abnormal muscle activity and Improve lumbosacral and coccygeal mobility in order to Improve ability to perform ADLs. 10 min Self Care: Thorough review and handout provided on the following:  Discussed perineal massage to help prepare patient for child birth. Demonstrated the techniques on the pelvic model   Rationale:  Increase awareness and understanding of current condition to improve patients ability to independently and effectively attain goals and progress towards long term management of current condition. 10 min Therapeutic Activity:  [x]  See flow sheet :   Rationale: Increase core strength and Inhibit abnormal muscle activity in order to Improve ability to perform ADLs.               With   [] TE   [] TA   [] neuro   [] other: Patient Education: [x] Review HEP    [] Progressed/Changed HEP based on:   [] positioning   [] body mechanics   [] transfers   [] heat/ice application    [] other:           Pain Level (0-10 scale) post treatment: 2/10    ASSESSMENT/Changes in Function: Patient tolerated today's session well with no c/o pain. Patient demonstrated understanding of today's instruction, education, and recommendations. Patient is progressing appropriately towards goals. Patient was educated on perineal massage to prepare her for child birth. Patient will continue to benefit from skilled PT services to modify and progress therapeutic interventions, address functional mobility deficits, address ROM deficits, address strength deficits, analyze and address soft tissue restrictions and analyze and cue movement patterns to attain remaining goals. [x]  See Plan of Care  []  See progress note/recertification  []  See Discharge Summary         Progress towards goals / Updated goals:  Short Term Goals: To be accomplished in 2 weeks:  1. Patient will be independent and compliant with HEP to progress toward goals and restore functional mobility. Eval Status: issued at [facility]  Current: advised pt get a SB for use as a chair at her home office to relieve some pressure and pain 1/20/22  Current:  Patient has ordered a SB 1/24/22  Current:  Patient reported that she received the Swiss ball yesterday. She has not used it for the HEP yet. 1/27/2022 Progressing     2. Patient will report pain no greater than a 6-7/10 in order to increase ease with completion of ADL's. Eval Status: pain at worst:10/10 pain at best is a 5/10  Current:  Pain at worst 9/10 and best 5/10  1/24/22  Current:  Pain at worst 8/10 when she was trying to get comfortable in bed and best 4/10 1/27/2022 progressing        Long Term Goals: To be accomplished in 8 weeks:  1. Patient will improve FOTO score to 58 points to demonstrate improvement in functional status.                     Status at IE:26                    Current: Same as IE                     Current:  FOTO= 50     2.   Pt will report the ability to ambulate for at least 30 minutes with pain less than or equal to 5/10 in order to improve ease with completion of grocery shopping.   Eval Status: pt reports the ability to walk for 15-20 minutes and being in significant pain after that.    Current:  Pt reports the ability to ambulate 15-20 minutes before having significant pain. 1/27/2022     3.   Pt will improve strength by at least . 5 grade MMT in order to increase ease with lifting activities. Eval Status: Strength (MMT): 5/5 with exceptions indicated below.                                             Hip Left (1-5) Right (1-5) Left   1/27/2022 Right   1/27/2022   Hip Flexion 3+ pain 3 pain 4 pain 4   Hip Extension( tested in s/l) 3+ 3- pain 3 pain     Hip ABD 4 3 pain 3 pain 3   Hip ADD 4- 4- increased right groin/ant hip pain 4-  pain 4- pain   Hip ER   4+, increased right groin/ant hip pain 5  No pain 5  No pain   Hip IR increased left groin/ant hip pain 4- increased right groin/ant hip pain.  4-  No pain 4-  No pain         4.   Patient will improve pain in daily activities to no greater than 3/10 at worst to improve activity tolerance and restore prior level of function. Eval Status: 10/10 at worst  Current:  Pain at worst 8/10 when she was trying to get comfortable in bed and best 4/10 1/27/2022 progressing        5. Patient will report the ability to sit for at least 1 hour with pain no greater than 5/10 in order to complete work duties with increased ease.   Eval status: pt reports she is unable to sit more than 15-20 minutes.   Current:  Patient reports being able to tolerate sitting on the swiss ball for more than 1 hour with pain no greater than 5/10  1/27/2022 Goal Met              PLAN  []  Upgrade activities as tolerated     [x]  Continue plan of care  [x]  Update interventions per flow sheet       []  Discharge due to:_  []  Other:_      Alpha Alfredo PT 2/4/2022  9:49 AM    Future Appointments   Date Time Provider Thuan Shrestha   2/9/2022 12:30 PM Brodie Latif [facility]   2/16/2022 12:30 PM Brodie Latif [facility]   2/23/2022 10:15 AM Brodie Latif [facility]   3/2/2022 12:30 PM Brodie Latif [facility]   3/9/2022 12:30 PM Brodie Latif [facility]   3/16/2022  1:15 PM [facility]   3/23/2022 12:30 PM Brodie Latfi, [address] [place] [address] [facility]   3/30/2022 12:30 PM Brodie Latif [facility]

## 2022-02-09 ENCOUNTER — HOSPITAL ENCOUNTER (OUTPATIENT)
Dept: PHYSICAL THERAPY | Age: 27
End: 2022-02-09
Payer: COMMERCIAL

## 2022-02-16 ENCOUNTER — TELEPHONE (OUTPATIENT)
Dept: PHYSICAL THERAPY | Age: 27
End: 2022-02-16

## 2022-02-16 ENCOUNTER — HOSPITAL ENCOUNTER (OUTPATIENT)
Dept: PHYSICAL THERAPY | Age: 27
Discharge: HOME | End: 2022-02-16
Payer: COMMERCIAL

## 2022-02-16 PROCEDURE — 97112 NEUROMUSCULAR REEDUCATION: CPT

## 2022-02-16 PROCEDURE — 97110 THERAPEUTIC EXERCISES: CPT

## 2022-02-16 PROCEDURE — 97535 SELF CARE MNGMENT TRAINING: CPT

## 2022-02-16 NOTE — PROGRESS NOTES
PT DAILY TREATMENT NOTE    Patient Name: Malik Andujar  Date:2022  : 1995  [x]  Patient  Verified  [facility] [address] / Plan: [address] / Product Type: Managed Care Medicaid /    In time:1240  Out time:118  Total Treatment Time (min): 38  Total Timed Codes (min): 38  1:1 Treatment Time ([facility] only): 45   Visit #: 6 of 12    Treatment Area: Left hip pain [M25.552]  Right hip pain [M25.551]    SUBJECTIVE  Pain Level (0-10 scale): 4/10  Any medication changes, allergies to medications, adverse drug reactions, diagnosis change, or new procedure performed?: [x] No    [] Yes (see summary sheet for update)  Subjective functional status/changes:   [] No changes reported  Patient reports: good compliance with current HEP. Discussed performing the perineal massage to prepare for child birth. Patient will ask her boyfriend to attend a PT session to learn how to perform the massage/stretch. OBJECTIVE    20 min Therapeutic Exercise:  [x] See flow sheet :   Rationale: Increase pelvic floor muscle strength and Increase core strength in order to decrease pain in pubic symphysis. 8 min Self Care: Thorough review and handout provided on the following: Discussed  And instructed on positions for the doing the perineal massage/stretch, Discussed sleep positions   Rationale:  Increase awareness and understanding of current condition to improve patients ability to independently and effectively attain goals and progress towards long term management of current condition. 10 min Neuromuscular Re-education:  [x]  See flow sheet :instructed pt on using a ball (ie lacrosse ball) to perform trigger point releases on her SI region and adductors. Rationale: allow the patient to decrease pain and tenderness in order to Improve ability to perform ADLs.                 With   [] TE   [] TA   [] neuro   [] other: Patient Education: [x] Review HEP    [] Progressed/Changed HEP based on:   [] positioning   [] body mechanics   [] transfers   [] heat/ice application    [] other:           Pain Level (0-10 scale) post treatment: 2/10    ASSESSMENT/Changes in Function: Patient tolerated today's session well with no c/o pain. Patient demonstrated understanding of today's instruction, education, and recommendations. Patient is progressing appropriately towards goals. Patient is now 33 weeks into her pregnancy. She reports pain in left hip during the night. Instructed the patient on positioning for sleep but she reported that this would not work for her. Patient will continue to benefit from skilled PT services to modify and progress therapeutic interventions, address functional mobility deficits, address ROM deficits, address strength deficits, analyze and address soft tissue restrictions, analyze and cue movement patterns and analyze and modify body mechanics/ergonomics to attain remaining goals. [x]  See Plan of Care  []  See progress note/recertification  []  See Discharge Summary         Progress towards goals / Updated goals:  Short Term Goals: To be accomplished in 2 weeks:  1. Patient will be independent and compliant with HEP to progress toward goals and restore functional mobility. Eval Status: issued at eval  Current: advised pt get a SB for use as a chair at her home office to relieve some pressure and pain 1/20/22  Current:  Patient has ordered a SB 1/24/22  Current:  Patient reported that she received the Swiss ball yesterday. Julieta Moffettia has not used it for the HEP yet.  1/27/2022 Progressing     2. Patient will report pain no greater than a 6-7/10 in order to increase ease with completion of ADL's.   Eval Status: pain at worst:10/10 pain at best is a 5/10  Current:  Pain at worst 9/10 and best 5/10  1/24/22  Current:  Pain at worst 8/10 when she was trying to get comfortable in bed and best 4/10 1/27/2022 progressing        Long Term Goals: To be accomplished in 8 weeks:  1. Patient will improve FOTO score to 58 points to demonstrate improvement in functional status.                   Status at IE:26                    Current: Same as IE                     Current:  FOTO= 50     2.   Pt will report the ability to ambulate for at least 30 minutes with pain less than or equal to 5/10 in order to improve ease with completion of grocery shopping.   Eval Status: pt reports the ability to walk for 15-20 minutes and being in significant pain after that.    Current:  Pt reports the ability to ambulate 15-20 minutes before having significant pain.  1/27/2022     3.   Pt will improve strength by at least . 5 grade MMT in order to increase ease with lifting activities. Eval Status: Strength (MMT): 5/5 with exceptions indicated below.                                             Hip Left (1-5) Right (1-5) Left   1/27/2022 Right   1/27/2022   Hip Flexion 3+ pain 3 pain 4 pain 4   Hip Extension( tested in s/l) 3+ 3- pain 3 pain     Hip ABD 4 3 pain 3 pain 3   Hip ADD 4- 4- increased right groin/ant hip pain 4-  pain 4- pain   Hip ER   4+, increased right groin/ant hip pain 5  No pain 5  No pain   Hip IR increased left groin/ant hip pain 4- increased right groin/ant hip pain.  4-  No pain 4-  No pain         4.   Patient will improve pain in daily activities to no greater than 3/10 at worst to improve activity tolerance and restore prior level of function. Eval Status: 10/10 at worst  Current:  Pain at worst 8/10 when she was trying to get comfortable in bed and best 4/10 1/27/2022 progressing   Current:  Pain at worst 8/10 when she was trying to get comfortable in bed and best 2/10  2/15/2022 Progressing     5. Patient will report the ability to sit for at least 1 hour with pain no greater than 5/10 in order to complete work duties with increased ease.   Eval status: pt reports she is unable to sit more than 15-20 minutes.   Current:  Patient reports being able to tolerate sitting on the swiss ball for more than 1 hour with pain no greater than 5/10  1/27/2022 Goal Met                 PLAN  []  Upgrade activities as tolerated     [x]  Continue plan of care  [x]  Update interventions per flow sheet       []  Discharge due to:_  []  Other:_      Nu Morgan, PT 2/16/2022  12:40 PM    Future Appointments   Date Time Provider Thuan Shrestha   2/23/2022 10:15 AM Vero Robertson, PT [facility]   3/2/2022 12:30 PM Vero Robertson, [facility]   3/9/2022 12:30 PM Vero Robertson, [facility]   [facility]16[facility]2022  [facility]   3/23/2022 12:30 PM Vero Robertson, [address] [place] [address] [facility]   3/30/2022 12:30 PM Vero Robertson, [facility]

## 2022-02-24 ENCOUNTER — HOSPITAL ENCOUNTER (OUTPATIENT)
Dept: PHYSICAL THERAPY | Age: 27
Discharge: HOME | End: 2022-02-24
Payer: COMMERCIAL

## 2022-02-24 ENCOUNTER — HOSPITAL ENCOUNTER (EMERGENCY)
Age: 27
Discharge: HOME | End: 2022-02-24
Attending: EMERGENCY MEDICINE
Payer: COMMERCIAL

## 2022-02-24 VITALS
OXYGEN SATURATION: 98 % | HEART RATE: 73 BPM | RESPIRATION RATE: 14 BRPM | DIASTOLIC BLOOD PRESSURE: 57 MMHG | SYSTOLIC BLOOD PRESSURE: 117 MMHG | TEMPERATURE: 97.1 F | WEIGHT: 218 LBS | BODY MASS INDEX: 40.12 KG/M2 | HEIGHT: 62 IN

## 2022-02-24 DIAGNOSIS — Z3A.34 34 WEEKS GESTATION OF PREGNANCY: ICD-10-CM

## 2022-02-24 DIAGNOSIS — S61.412A LACERATION OF LEFT HAND WITHOUT FOREIGN BODY, INITIAL ENCOUNTER: Primary | ICD-10-CM

## 2022-02-24 PROCEDURE — 97530 THERAPEUTIC ACTIVITIES: CPT

## 2022-02-24 PROCEDURE — 97112 NEUROMUSCULAR REEDUCATION: CPT

## 2022-02-24 PROCEDURE — 75810000293 HC SIMP/SUPERF WND  RPR

## 2022-02-24 PROCEDURE — 99281 EMR DPT VST MAYX REQ PHY/QHP: CPT

## 2022-02-24 PROCEDURE — 97110 THERAPEUTIC EXERCISES: CPT

## 2022-02-24 NOTE — ED PROVIDER NOTES
EMERGENCY DEPARTMENT HISTORY AND PHYSICAL EXAM    Date: 2/24/2022  Patient Name: Ryan Fritz    History of Presenting Illness     Time Seen: 7490 PM    Chief Complaint   Patient presents with    Laceration       History Provided By: Patient    Additional History (Context):   Ryan Fritz is a 23 y.o. female presents emergency room less than 1 hour status post injury to her left hand after accidentally cutting herself with a kitchen knife. Sustained a small laceration to the palm of her left hand. Some bleeding but now controlled by pressure. Patient is currently 34 weeks pregnant. Had a tetanus shot done here recently. Patient denies any numbness or tingling to her hand or fingers. She is right-hand dominant. No other injuries. PCP: Lorelei Benedict, DO    Current Outpatient Medications   Medication Sig Dispense Refill    prenatal multivit-ca-min-fe-fa (Prenatal Vitamin) tab Take 1 Tablet by mouth daily. 30 Tablet 0    levonorgestreL (Mirena) 20 mcg/24 hours (5 yrs) 52 mg IUD 1 Device by IntraUTERine route once. (Patient not taking: Reported on 1/7/2022)         Past History     Past Medical History:  Past Medical History:   Diagnosis Date    Abnormal Papanicolaou smear of cervix     2018, 2019    Interstitial cystitis     Interstitial cystitis     Nicotine vapor product user        Past Surgical History:  Past Surgical History:   Procedure Laterality Date    HX REFRACTIVE SURGERY         Family History:  History reviewed. No pertinent family history. Social History:  Social History     Tobacco Use    Smoking status: Never Smoker    Smokeless tobacco: Never Used   Substance Use Topics    Alcohol use: Not Currently     Comment: social    Drug use: Not Currently     Types: Marijuana       Allergies:  No Known Allergies      Review of Systems   Review of Systems   Musculoskeletal:        Left hand pain   Skin: Positive for wound.    Neurological: Negative for weakness and numbness. All other systems reviewed and are negative. Physical Exam     Vitals:    02/24/22 1732   BP: (!) 117/57   Pulse: 73   Resp: 14   Temp: 97.1 °F (36.2 °C)   SpO2: 98%   Weight: 98.9 kg (218 lb)   Height: 5' 2\" (1.575 m)     Physical Exam  Vitals and nursing note reviewed. Constitutional:       General: She is not in acute distress. Appearance: Normal appearance. She is well-developed, well-groomed and normal weight. Comments: Pleasant 18-year-old female no apparent distress. Vital signs are stable. Cooperative. Cardiovascular:      Rate and Rhythm: Normal rate and regular rhythm. Heart sounds: Normal heart sounds. Pulmonary:      Effort: Pulmonary effort is normal.      Breath sounds: Normal breath sounds. Musculoskeletal:      Left hand: Laceration and tenderness present. No swelling or bony tenderness. Normal range of motion. Normal strength. Normal sensation. Normal capillary refill. Hands:       Comments: Left hand -palmar surface shows evidence of a 1 cm laceration. Full thickness. Wound appears to be more in an oblique fashion. There appears to be some subcutaneous fat extending from the wound. No active bleeding. No soft tissue swelling or bruising. Mildly tender. Full range of motion in the fingers with good  strength. Neurovascular intact distally. Skin:     General: Skin is warm and dry. Neurological:      Mental Status: She is alert and oriented to person, place, and time. Psychiatric:         Behavior: Behavior is cooperative. Nursing note and vitals reviewed         Diagnostic Study Results     Labs -   No results found for this or any previous visit (from the past 12 hour(s)). Radiologic Studies   No orders to display     CT Results  (Last 48 hours)    None        CXR Results  (Last 48 hours)    None            Medical Decision Making   I am the first provider for this patient.     I reviewed the vital signs, available nursing notes, past medical history, past surgical history, family history and social history. Vital Signs-Reviewed the patient's vital signs. Records Reviewed: Nursing Notes    DDX: Hand laceration    Procedures:  Wound Repair    Date/Time: 2/24/2022 5:58 PM  Performed by: PA ([address] provider: Bryce  Preparation: skin prepped with ChloraPrep  Pre-procedure re-eval: Immediately prior to the procedure, the patient was reevaluated and found suitable for the planned procedure and any planned medications. Location details: left hand  Wound length:2.5 cm or less  Anesthesia method: none. Foreign bodies: no foreign bodies  Irrigation solution: saline  Irrigation method: syringe  Debridement: minimal  Skin closure: glue  Approximation: close  Dressing: bandaid. Patient tolerance: patient tolerated the procedure well with no immediate complications  My total time at bedside, performing this procedure was 1-15 minutes. ED Course:   Initial assessment performed. The patients presenting problems have been discussed, and they are in agreement with the care plan formulated and outlined with them. I have encouraged them to ask questions as they arise throughout their visit. ED Physician Discussion Note:   Cleaned wound. Minimal debridement done. Decided that did not need sutures. Placed Dermabond to approximate the wound edges. Discharge    Diagnosis and Disposition       DISCHARGE NOTE:  Sukhwinder Smith's  results have been reviewed with her. She has been counseled regarding her diagnosis, treatment, and plan. She verbally conveys understanding and agreement of the signs, symptoms, diagnosis, treatment and prognosis and additionally agrees to follow up as discussed. She also agrees with the care-plan and conveys that all of her questions have been answered.   I have also provided discharge instructions for her that include: educational information regarding their diagnosis and treatment, and list of reasons why they would want to return to the ED prior to their follow-up appointment, should her condition change. She has been provided with education for proper emergency department utilization. CLINICAL IMPRESSION:    1. Laceration of left hand without foreign body, initial encounter    2. 34 weeks gestation of pregnancy        PLAN:  1. D/C Home  2. Discharge Medication List as of 2/24/2022  6:14 PM        3. Follow-up Information     Follow up With Specialties Details Why Contact Julio Meyers, DO Emergency Medicine  As needed [address] [place] [address]      [facility] EMERGENCY DEPT Emergency Medicine  As needed [address] [place] [id number]  [phone]        ____________________________________     Please note that this dictation was completed with [employer], the computer voice recognition software. Quite often unanticipated grammatical, syntax, homophones, and other interpretive errors are inadvertently transcribed by the computer software. Please disregard these errors. Please excuse any errors that have escaped final proofreading.

## 2022-02-24 NOTE — PROGRESS NOTES
[facility]  [address] [facility] [address], [address] [facility]  Ph [phone]  Fx [phone]    Progress Note  Patient name: Ronnie Glez Start of Care: 12/29/2021   Referral Burke Garcia MD JID: [id number]/[id number]                Medical Diagnosis: Left hip pain [M25.552]  Right hip pain [M25.551]    Onset Date:12/8/21                Treatment Diagnosis: left and right hip pain, SIJ                                              ICD-10: M25.552, M25.551   Prior Hospitalization: see medical history Provider#: [id number]   Medications: Verified on Patient summary List   Comorbidities/PMHx/Surgical Hx:   [x]? ? ? Other: interstitial cystitis dx in 2014. First pregnancy- at 25 weeks right now: due date second week April Prior level of function: functionally independent, no AD, moderately active lifestyle    Visits from Start of Care: 7    Missed Visits: 1    Updated Goals/Measure of Progress: To be achieved in 4 weeks:  Short Term Goals: To be accomplished in 2 weeks:  1. Patient will be independent and compliant with HEP to progress toward goals and restore functional mobility. Eval Status: issued at [facility]  Current: advised pt get a SB for use as a chair at her home office to relieve some pressure and pain 1/20/22  Current:  Patient has ordered a SB 1/24/22  Current:  Patient reported that she received the Swiss ball yesterday. Benita Clark has not used it for the HEP yet.  1/27/2022 Progressing  2/24/22: slow progress, patient reports HEP semi-compliance     2. Patient will report pain no greater than a 6-7/10 in order to increase ease with completion of ADL's. Eval Status: pain at worst:10/10 pain at best is a 5/10  Current:  Pain at worst 9/10 and best 5/10  1/24/22  Current:  Pain at worst 8/10 when she was trying to get comfortable in bed and best 4/10 1/27/2022 progressing  2/24/22: regression, worst pain: 9/10 pain         Long Term Goals: To be accomplished in 8 weeks:  1.  Patient will improve FOTO score to 58 points to demonstrate improvement in functional status.                   Status at IE:26                    Current: Same as IE                     Current:  FOTO= 50                    2/24/22: slight regression, score to 48      2.   Pt will report the ability to ambulate for at least 30 minutes with pain less than or equal to 5/10 in order to improve ease with completion of grocery shopping.   Eval Status: pt reports the ability to walk for 15-20 minutes and being in significant pain after that.    Current:  Pt reports the ability to ambulate 15-20 minutes before having significant pain.  1/27/2022 2/24/22: MET, patient reports able to ambulate x1 hour before significant increase in pain     3.   Pt will improve strength by at least . 5 grade MMT in order to increase ease with lifting activities. Eval Status: Strength (MMT): 5/5 with exceptions indicated below.                                             Hip Left (1-5) Right (1-5) Left   1/27/2022 Right   1/27/2022 Left  2/24/22 Right  2/24/22   Hip Flexion 3+ pain 3 pain 4 pain 4 Not tested due to increase in pain Not tested due to increase in pain   Hip Extension( tested in s/l) 3+ 3- pain 3 pain        Hip ABD 4 3 pain 3 pain 3 NT NT   Hip ADD 4- 4- increased right groin/ant hip pain 4-  pain 4- pain NT NT   Hip ER   4+, increased right groin/ant hip pain 5  No pain 5  No pain NT NT   Hip IR increased left groin/ant hip pain 4- increased right groin/ant hip pain.  4-  No pain 4-  No pain NT NT         4.   Patient will improve pain in daily activities to no greater than 3/10 at worst to improve activity tolerance and restore prior level of function.   Eval Status: 10/10 at worst  Current:  Pain at worst 8/10 when she was trying to get comfortable in bed and best 4/10 1/27/2022 progressing   Current:  Pain at worst 8/10 when she was trying to get comfortable in bed and best 2/10  2/15/2022 Progressing  2/24/22: progressing, 4/10 average pain with ADLs     5. Patient will report the ability to sit for at least 1 hour with pain no greater than 5/10 in order to complete work duties with increased ease. Eval status: pt reports she is unable to sit more than 15-20 minutes.   Current:  Patient reports being able to tolerate sitting on the swiss ball for more than 1 hour with pain no greater than 5/10  1/27/2022 Goal Met          Key Functional Changes:            Excellent Good         Limited           None  [] Increase Pelvic MM strength       []  []  []  []  [] Decrease Pelvic MM hypertonus     []  []  []  []  [] Decrease Incontinence Episodes    []  []  []  []   [] Improve Voiding Habits        []  []  []  []   [] Decreased Urgency        []  []  []  []  [x] Decrease Pelvic Pain        []  []  [x]  []      ASSESSMENT/RECOMMENDATIONS:  Patient is currently x34 weeks pregnant. Patient arrives to session today with elevated pain (9/10) with reports of excessive ambulation on Tuesday, 2/22/22, contributing. Patient reports that she still wishes to participate in therapy today. FOTO with slight regression; again, most likely due to elevated pain upon arrival and due to increased ambulation/physical activity on Tuesday. Patient does report improvement with overall average pain levels (4/10) and reports being able to ambulate x1 hour void of increase in pain.  Patient will benefit from continued, skilled pelvic floor PT 1x4 weeks.      [x]Continue therapy per initial plan/protocol at a frequency of  1 x per week for 4 weeks  []Continue therapy with the following recommended changes:_____________________      _____________________________________________________________________  []Discontinue therapy progressing towards or have reached established goals  []Discontinue therapy due to lack of appreciable progress towards goals  []Discontinue therapy due to lack of attendance or compliance  []Await Physician's recommendations/decisions regarding therapy  []Other:________________________________________________________________    Thank you for this referral.   Erin Brain 2/24/2022 10:46 AM  NOTE TO PHYSICIAN:  PLEASE COMPLETE THE ORDERS BELOW AND   [facility]: [phone]  If you are unable to process this request in 24 hours please contact our office: [phone]      ? I have read the above report and request that my patient continue as recommended. ? I have read the above report and request that my patient continue therapy with the following changes/special instructions:___________________________________________________________  ? I have read the above report and request that my patient be discharged from therapy.

## 2022-02-24 NOTE — PROGRESS NOTES
PF DAILY TREATMENT NOTE 10-18    Patient Name: Kamilah Olivarez  Date:2022  : 1995  [x]  Patient  Verified  Payor: [address] [place] [address] Road / Plan: [address] / Product Type: Managed Care Medicaid /    In time:10:22 am  Out time:10:52 am  Total Treatment Time (min): 30  Visit #: 7 of 12    Medicare/BCBS Only   Total Timed Codes (min):  30 1:1 Treatment Time:       Treatment Area: [] Pelvic Floor     [] Other:    SUBJECTIVE  Pain Level (0-10 scale): 9  Any medication changes, allergies to medications, adverse drug reactions, diagnosis change, or new procedure performed?: [x] No    [] Yes (see summary sheet for update)  Subjective functional status/changes:   [] No changes reported  Patient reports 9/10 pain upon arrival. Patient reports excessive ambulation on 22.      OBJECTIVE        10 min Therapeutic Exercise:  [x] See flow sheet :   []  Pelvic floor strengthening                 []  Pelvic floor downtraining  []  Quality pelvic floor contractions       []  Relaxation techniques  []  Urge suppression exercises  []  Other:  Rationale: increase ROM, increase strength and improve coordination  to improve the patients ability to increase ease with ADLs       10 min Therapeutic Activity:  [x]  See flow sheet :  -education on breathing with exercises and in preparation for labor      []  Increase Tissue extensibility        []  Assess fiber intake    []  Assess voiding habits  []  Assess bowel habits  []  Other:   Rationale: increase strength and improve coordination  to improve the patients ability to improve ease with ADLs      10 min Neuromuscular Re-education:  [x]  See flow sheet :   []  Pelvic floor strengthening                 []  Pelvic floor downtraining  []  Quality pelvic floor contractions       []  Relaxation techniques  []  Urge suppression exercises  []  Other:  Rationale: increase strength, improve coordination, improve balance and increase proprioception  to improve the patients ability to improve pelvic floor awareness and coordination              With   [] TE   [] TA   [] neuro  [] manual   [] other: Patient Education: [x] Review HEP    [] Progressed/Changed HEP based on:   [] positioning   [] body mechanics   [] transfers   [] heat/ice application    [] other:      Other Objective/Functional Measures:     Pain Level (0-10 scale) post treatment: 9    ASSESSMENT/Changes in Function:     Patient is currently x34 weeks pregnant. Patient arrives to session today with elevated pain (9/10) with reports of excessive ambulation on Tuesday, 2/22/22, contributing. Patient reports that she still wishes to participate in therapy today. FOTO with slight regression; again, most likely due to elevated pain upon arrival and due to increased ambulation/physical activity on Tuesday. Patient does report improvement with overall average pain levels (4/10) and reports being able to ambulate x1 hour void of increase in pain. Patient will benefit from continued, skilled pelvic floor PT 1x4 weeks. Patient will continue to benefit from skilled PT services to modify and progress therapeutic interventions, address functional mobility deficits, address ROM deficits, address strength deficits, analyze and address soft tissue restrictions, analyze and cue movement patterns, analyze and modify body mechanics/ergonomics, assess and modify postural abnormalities and instruct in home and community integration to attain remaining goals. []  See Plan of Care  [x]  See progress note/recertification  []  See Discharge Summary         Progress towards goals / Updated goals:  Short Term Goals: To be accomplished in 2 weeks:  1. Patient will be independent and compliant with HEP to progress toward goals and restore functional mobility.    [facility] Status: issued at [facility]  Current: advised pt get a SB for use as a chair at her home office to relieve some pressure and pain 1/20/22  Current:  Patient has ordered a SB 1/24/22  Current:  Patient reported that she received the Swiss ball yesterday. Denys Finch has not used it for the HEP yet.  1/27/2022 Progressing  2/24/22: slow progress, patient reports HEP semi-compliance     2. Patient will report pain no greater than a 6-7/10 in order to increase ease with completion of ADL's. Eval Status: pain at worst:10/10 pain at best is a 5/10  Current:  Pain at worst 9/10 and best 5/10  1/24/22  Current:  Pain at worst 8/10 when she was trying to get comfortable in bed and best 4/10 1/27/2022 progressing  2/24/22: regression, worst pain: 9/10 pain         Long Term Goals: To be accomplished in 8 weeks:  1. Patient will improve FOTO score to 58 points to demonstrate improvement in functional status.                   Status at IE:26                    Current: Same as IE                     Current:  FOTO= 50    2/24/22: slight regression, score to 48      2.   Pt will report the ability to ambulate for at least 30 minutes with pain less than or equal to 5/10 in order to improve ease with completion of grocery shopping.   Eval Status: pt reports the ability to walk for 15-20 minutes and being in significant pain after that.    Current:  Pt reports the ability to ambulate 15-20 minutes before having significant pain.  1/27/2022 2/24/22: MET, patient reports able to ambulate x1 hour before significant increase in pain     3.   Pt will improve strength by at least . 5 grade MMT in order to increase ease with lifting activities.   Eval Status: Strength (MMT): 5/5 with exceptions indicated below.                                             Hip Left (1-5) Right (1-5) Left   1/27/2022 Right   1/27/2022 Left  2/24/22 Right  2/24/22   Hip Flexion 3+ pain 3 pain 4 pain 4 Not tested due to increase in pain Not tested due to increase in pain   Hip Extension( tested in s/l) 3+ 3- pain 3 pain      Hip ABD 4 3 pain 3 pain 3 NT NT   Hip ADD 4- 4- increased right groin/ant hip pain 4-  pain 4- pain NT NT   Hip ER   4+, increased right groin/ant hip pain 5  No pain 5  No pain NT NT   Hip IR increased left groin/ant hip pain 4- increased right groin/ant hip pain.  4-  No pain 4-  No pain NT NT         4.   Patient will improve pain in daily activities to no greater than 3/10 at worst to improve activity tolerance and restore prior level of function. Eval Status: 10/10 at worst  Current:  Pain at worst 8/10 when she was trying to get comfortable in bed and best 4/10 1/27/2022 progressing   Current:  Pain at worst 8/10 when she was trying to get comfortable in bed and best 2/10  2/15/2022 Progressing  2/24/22: progressing, 4/10 average pain with ADLs     5. Patient will report the ability to sit for at least 1 hour with pain no greater than 5/10 in order to complete work duties with increased ease.   Eval status: pt reports she is unable to sit more than 15-20 minutes.   Current:  Patient reports being able to tolerate sitting on the swiss ball for more than 1 hour with pain no greater than 5/10  1/27/2022 Goal Met              PLAN  []  Upgrade activities as tolerated     [x]  Continue plan of care  []  Update interventions per flow sheet       []  Discharge due to:_  []  Other:_      Selena Marcus 2/24/2022  7:59 AM    Future Appointments   Date Time Provider Thaun Shrestha   2/24/2022 10:15 AM [facility]   3/2/2022 12:30 PM Dayana Heart, [address] [facility]   3/9/2022 12:30 PM Dayana Heart, PT [facility]   3/16/2022  1:15 PM [facility]   3/23/2022 12:30 PM Dayana Heart, [address] [facility]   3/30/2022 12:30 PM Dayana Heart, PT [facility]

## 2022-02-24 NOTE — DISCHARGE INSTRUCTIONS
Limited use of left hand  Allow skin glue to fall off on its own  Tylenol for pain  Good wound care  Watch for infection

## 2022-03-02 ENCOUNTER — HOSPITAL ENCOUNTER (OUTPATIENT)
Dept: PHYSICAL THERAPY | Age: 27
Discharge: HOME | End: 2022-03-02
Payer: COMMERCIAL

## 2022-03-02 PROCEDURE — 97112 NEUROMUSCULAR REEDUCATION: CPT

## 2022-03-02 PROCEDURE — 97110 THERAPEUTIC EXERCISES: CPT

## 2022-03-02 PROCEDURE — 97530 THERAPEUTIC ACTIVITIES: CPT

## 2022-03-02 NOTE — PROGRESS NOTES
PF DAILY TREATMENT NOTE 10-18    Patient Name: Rachna Yun  Date:3/2/2022  : 1995  [x]  Patient  Verified  Payor: [address] [place] [address] Road / Plan: [address] / Product Type: Managed Care Medicaid /    In time:1234  Out time:115  Total Treatment Time (min): 41  Visit #: 8 of 12    Medicare/BCBS Only   Total Timed Codes (min):  41 1:1 Treatment Time:  41     Treatment Area: [x] Pelvic Floor     [] Other:    SUBJECTIVE  Pain Level (0-10 scale): 7/10  Any medication changes, allergies to medications, adverse drug reactions, diagnosis change, or new procedure performed?: [x] No    [] Yes (see summary sheet for update)  Subjective functional status/changes:   [] No changes reported  Patient reports increased pain due to baby \"dropping\" . She reports increase pain during the night that wakes her.       OBJECTIVE    22 min Therapeutic Exercise:  [] See flow sheet :   []  Pelvic floor strengthening                 []  Pelvic floor downtraining  []  Quality pelvic floor contractions       []  Relaxation techniques  []  Urge suppression exercises  [x]  Other:   Rationale: increase ROM, increase strength and decrease pain caused by pubic symphysis dysfuntion  to improve the patients ability to perform daily tasks with decreased pain       9 min Therapeutic Activity:  [x]  See flow sheet :    []  Increase Tissue extensibility        []  Assess fiber intake    []  Assess voiding habits  []  Assess bowel habits  [x]  Other: TA activation with functional tasks to decrease pain    Rationale: increase strength and improve coordination  to improve the patients ability to perform daily tasks with reduced pain      10 min Neuromuscular Re-education:  [x]  See flow sheet :   []  Pelvic floor strengthening                 []  Pelvic floor downtraining  []  Quality pelvic floor contractions       [x]  Relaxation techniques  []  Urge suppression exercises  [x]  Other: instruction on techniques to decrease pain   Rationale: increase ROM and improve coordination  to improve the patients ability to manage her pain              With   [] TE   [] TA   [] neuro  [] manual   [] other: Patient Education: [x] Review HEP    [] Progressed/Changed HEP based on:   [] positioning   [] body mechanics   [] transfers   [] heat/ice application    [] other:        Pain Level (0-10 scale) post treatment: 5/10    ASSESSMENT/Changes in Function: Patient tolerated today's session well with no c/o pain. Patient demonstrated understanding of today's instruction, education, and recommendations. Patient is approximately 35 weeks into her pregnancy. She will likely continue to have pain in pubic symphysis until after birth of her daughter. Patient reports decrease pain after therapy sessions. PT suggested to have a physical therapy session with the patient and her  to teach him techniques to help decrease the patient's pain and techniques to prepare her for birth. Patient will continue to benefit from skilled PT services to modify and progress therapeutic interventions, address functional mobility deficits, address ROM deficits, address strength deficits, analyze and address soft tissue restrictions, analyze and cue movement patterns and analyze and modify body mechanics/ergonomics to attain remaining goals. []  See Plan of Care  []  See progress note/recertification  []  See Discharge Summary         Progress towards goals / Updated goals:  Short Term Goals: To be accomplished in 2 weeks:  1. Patient will be independent and compliant with HEP to progress toward goals and restore functional mobility.    [facility] Status: issued at eval  Current: advised pt get a SB for use as a chair at her home office to relieve some pressure and pain 1/20/22  Current:  Patient has ordered a SB 1/24/22  Current:  Patient reported that she received the Swiss ball yesterday. Julieta Queen has not used it for the HEP yet.  1/27/2022 Progressing  2/24/22: slow progress, patient reports HEP semi-compliance     2. Patient will report pain no greater than a 6-7/10 in order to increase ease with completion of ADL's. Eval Status: pain at worst:10/10 pain at best is a 5/10  Current:  Pain at worst 9/10 and best 5/10  1/24/22  Current:  Pain at worst 8/10 when she was trying to get comfortable in bed and best 4/10 1/27/2022 progressing  2/24/22: regression, worst pain: 9/10 pain   3/3/2022  Patient reports pain as 7/10. She reports that the baby has dropped which has increased her pain. Progressing        Long Term Goals: To be accomplished in 8 weeks:  1. Patient will improve FOTO score to 58 points to demonstrate improvement in functional status.                   Status at IE:26                    Current: Same as IE                     Current:  FOTO= 50                    2/24/22: slight regression, score to 48      2.   Pt will report the ability to ambulate for at least 30 minutes with pain less than or equal to 5/10 in order to improve ease with completion of grocery shopping.   Eval Status: pt reports the ability to walk for 15-20 minutes and being in significant pain after that.    Current:  Pt reports the ability to ambulate 15-20 minutes before having significant pain.  1/27/2022 2/24/22: MET, patient reports able to ambulate x1 hour before significant increase in pain     3.   Pt will improve strength by at least . 5 grade MMT in order to increase ease with lifting activities.   Eval Status: Strength (MMT): 5/5 with exceptions indicated below.                                             Hip Left (1-5) Right (1-5) Left   1/27/2022 Right   1/27/2022 Left  2/24/22 Right  2/24/22   Hip Flexion 3+ pain 3 pain 4 pain 4 Not tested due to increase in pain Not tested due to increase in pain   Hip Extension( tested in s/l) 3+ 3- pain 3 pain         Hip ABD 4 3 pain 3 pain 3 NT NT   Hip ADD 4- 4- increased right groin/ant hip pain 4-  pain 4- pain NT NT   Hip ER   4+, increased right groin/ant hip pain 5  No pain 5  No pain NT NT   Hip IR increased left groin/ant hip pain 4- increased right groin/ant hip pain.  4-  No pain 4-  No pain NT NT         4.   Patient will improve pain in daily activities to no greater than 3/10 at worst to improve activity tolerance and restore prior level of function. Eval Status: 10/10 at worst  Current:  Pain at worst 8/10 when she was trying to get comfortable in bed and best 4/10 1/27/2022 progressing   Current:  Pain at worst 8/10 when she was trying to get comfortable in bed and best 2/10  2/15/2022 Progressing  2/24/22: progressing, 4/10 average pain with ADLs     5. Patient will report the ability to sit for at least 1 hour with pain no greater than 5/10 in order to complete work duties with increased ease.   Eval status: pt reports she is unable to sit more than 15-20 minutes.   Current:  Patient reports being able to tolerate sitting on the swiss ball for more than 1 hour with pain no greater than 5/10  1/27/2022 Goal Met           PLAN  []  Upgrade activities as tolerated     []  Continue plan of care  []  Update interventions per flow sheet       []  Discharge due to:_  []  Other:_      Florencio Rushing, PT 3/2/2022  12:39 PM    Future Appointments   Date Time Provider Thuan Shrestha   3/9/2022 12:30 PM Jess Britton, PT [facility]   3/16/2022  1:15 PM Celso Titi [facility]   3/23/2022 12:30 PM Jess Britton, [address] [place] [address]   3/30/2022 12:30 PM Jess Britton, PT [facility]

## 2022-03-09 ENCOUNTER — HOSPITAL ENCOUNTER (OUTPATIENT)
Dept: PHYSICAL THERAPY | Age: 27
Discharge: HOME | End: 2022-03-09
Payer: COMMERCIAL

## 2022-03-09 LAB — GRBS, EXTERNAL: POSITIVE

## 2022-03-09 PROCEDURE — 97535 SELF CARE MNGMENT TRAINING: CPT

## 2022-03-09 PROCEDURE — 97110 THERAPEUTIC EXERCISES: CPT

## 2022-03-09 PROCEDURE — 97530 THERAPEUTIC ACTIVITIES: CPT

## 2022-03-09 NOTE — PROGRESS NOTES
PF DAILY TREATMENT NOTE 10-18    Patient Name: Ajay Husain  Date:3/9/2022  : 1995  [x]  Patient  Verified  Payor: [address] [place] [address] Road / Plan: [address] / Product Type: Managed Care Medicaid /    In time:1220  Out time:104  Total Treatment Time (min): 44  Visit #: 9 of 12    Medicare/BCBS Only   Total Timed Codes (min):  44 1:1 Treatment Time:  44     Treatment Area: [x] Pelvic Floor     [] Other:    SUBJECTIVE  Pain Level (0-10 scale): 5/10  Any medication changes, allergies to medications, adverse drug reactions, diagnosis change, or new procedure performed?: [x] No    [] Yes (see summary sheet for update)  Subjective functional status/changes:   [] No changes reported  Patient reports already dilating to 2 cm. She is moving on Friday to a 2nd floor apartment. Reviewed step climbing with patient.     OBJECTIVE    22 min Therapeutic Exercise:  [x] See flow sheet :   []  Pelvic floor strengthening                 []  Pelvic floor downtraining  []  Quality pelvic floor contractions       []  Relaxation techniques  []  Urge suppression exercises  []  Other:  Rationale: increase ROM, increase strength and improve coordination  to improve the patients ability to manage her symptoms and prepare for the birth of her child       14 min Therapeutic Activity:  [x]  See flow sheet :    [x]  Increase Tissue extensibility        []  Assess fiber intake    []  Assess voiding habits  []  Assess bowel habits  []  Other:    Rationale: increase ROM  to improve the patients ability to prepare for the birth of her child      8 min Self Care :  []  See flow sheet : review perineal massage , contract/relax,    []  Pelvic floor strengthening                 [x]  Pelvic floor downtraining  []  Quality pelvic floor contractions       [x]  Relaxation techniques  []  Urge suppression exercises  []  Other:  Rationale: increase ROM  to improve the patients ability to prepare for the birth of her child              With   [] TE   [] TA   [] neuro  [] manual   [] other: Patient Education: [x] Review HEP    [] Progressed/Changed HEP based on:   [] positioning   [] body mechanics   [] transfers   [] heat/ice application    [] other:          Pain Level (0-10 scale) post treatment: 3/10    ASSESSMENT/Changes in Function: Patient tolerated today's session well with no c/o pain. Patient demonstrated understanding of today's instruction, education, and recommendations. Patient is progressing appropriately towards goals. Reviewed perineal massage with patient. She declined having the therapist help her perform the technique to help modify to prevent increase in pain. Patient reports having fainted due to pain in the past and is concerned that this will happen during the birth. Patient will continue to benefit from skilled PT services to modify and progress therapeutic interventions, address functional mobility deficits, address ROM deficits, address strength deficits, analyze and address soft tissue restrictions and analyze and cue movement patterns to attain remaining goals. []  See Plan of Care  []  See progress note/recertification  []  See Discharge Summary         Progress towards goals / Updated goals:  Short Term Goals: To be accomplished in 2 weeks:  1. Patient will be independent and compliant with HEP to progress toward goals and restore functional mobility.    Eval Status: issued at [facility]  Current: advised pt get a SB for use as a chair at her home office to relieve some pressure and pain 1/20/22  Current:  Patient has ordered a SB 1/24/22  Current:  Patient reported that she received the Swiss ball yesterday. [facility] has not used it for the HEP yet.  1/27/2022 Progressing  2/24/22: slow progress, patient reports HEP semi-compliance  3/9/2022 Patient reports good compliance with HEP.       2. Patient will report pain no greater than a 6-7/10 in order to increase ease with completion of ADL's.  Eval Status: pain at worst:10/10 pain at best is a 5/10  Current:  Pain at worst 9/10 and best 5/10  1/24/22  Current:  Pain at worst 8/10 when she was trying to get comfortable in bed and best 4/10 1/27/2022 progressing  2/24/22: regression, worst pain: 9/10 pain   3/3/2022  Patient reports pain as 7/10. She reports that the baby has dropped which has increased her pain. Progressing        Long Term Goals: To be accomplished in 8 weeks:  1. Patient will improve FOTO score to 58 points to demonstrate improvement in functional status.                   Status at IE:26                    Current: Same as IE                     Current:  FOTO= 50                    2/24/22: slight regression, score to 48      2.   Pt will report the ability to ambulate for at least 30 minutes with pain less than or equal to 5/10 in order to improve ease with completion of grocery shopping.   Eval Status: pt reports the ability to walk for 15-20 minutes and being in significant pain after that.    Current:  Pt reports the ability to ambulate 15-20 minutes before having significant pain.  1/27/2022 2/24/22: MET, patient reports able to ambulate x1 hour before significant increase in pain     3.   Pt will improve strength by at least . 5 grade MMT in order to increase ease with lifting activities.   Eval Status: Strength (MMT): 5/5 with exceptions indicated below.                                             Hip Left (1-5) Right (1-5) Left   1/27/2022 Right   1/27/2022 Left  2/24/22 Right  2/24/22   Hip Flexion 3+ pain 3 pain 4 pain 4 Not tested due to increase in pain Not tested due to increase in pain   Hip Extension( tested in s/l) 3+ 3- pain 3 pain         Hip ABD 4 3 pain 3 pain 3 NT NT   Hip ADD 4- 4- increased right groin/ant hip pain 4-  pain 4- pain NT NT   Hip ER   4+, increased right groin/ant hip pain 5  No pain 5  No pain NT NT   Hip IR increased left groin/ant hip pain 4- increased right groin/ant hip pain.  4-  No pain 4-  No pain NT NT         4.   Patient will improve pain in daily activities to no greater than 3/10 at worst to improve activity tolerance and restore prior level of function. Eval Status: 10/10 at worst  Current:  Pain at worst 8/10 when she was trying to get comfortable in bed and best 4/10 1/27/2022 progressing   Current:  Pain at worst 8/10 when she was trying to get comfortable in bed and best 2/10  2/15/2022 Progressing  2/24/22: progressing, 4/10 average pain with ADLs     5. Patient will report the ability to sit for at least 1 hour with pain no greater than 5/10 in order to complete work duties with increased ease.   Eval status: pt reports she is unable to sit more than 15-20 minutes.   Current:  Patient reports being able to tolerate sitting on the swiss ball for more than 1 hour with pain no greater than 5/10  1/27/2022 Goal Met              PLAN  []  Upgrade activities as tolerated     []  Continue plan of care  []  Update interventions per flow sheet       []  Discharge due to:_  []  Other:_      Bennetta Oppenheim, PT 3/9/2022  12:29 PM    Future Appointments   Date Time Provider Thuan Shrestha   3/9/2022 12:30 PM Elsa Duong PT [facility]   3/16/2022  1:15 PM [facility]   3/23/2022  9:30 AM [facility]   3/30/2022 12:30 PM Elsa Duong PT [facility]

## 2022-03-16 ENCOUNTER — HOSPITAL ENCOUNTER (OUTPATIENT)
Dept: PHYSICAL THERAPY | Age: 27
Discharge: HOME | End: 2022-03-16
Payer: COMMERCIAL

## 2022-03-16 PROCEDURE — 97530 THERAPEUTIC ACTIVITIES: CPT

## 2022-03-16 PROCEDURE — 97110 THERAPEUTIC EXERCISES: CPT

## 2022-03-16 PROCEDURE — 97112 NEUROMUSCULAR REEDUCATION: CPT

## 2022-03-16 NOTE — PROGRESS NOTES
[facility] DAILY TREATMENT NOTE 10-18    Patient Name: Celeste Sanchez  Date:3/16/2022  : 1995  [x]  Patient  Verified  Payor: Gina Montiel [address] / Plan: [address] / Product Type: Managed Care Medicaid /    In time:1:13 PM  Out time:1:46 PM  Total Treatment Time (min): 33  Visit #: 10 of 12    Medicare/BCBS Only   Total Timed Codes (min):  33 1:1 Treatment Time:       Treatment Area: [] Pelvic Floor     [] Other:    SUBJECTIVE  Pain Level (0-10 scale): 7  Any medication changes, allergies to medications, adverse drug reactions, diagnosis change, or new procedure performed?: [x] No    [] Yes (see summary sheet for update)  Subjective functional status/changes:   [] No changes reported  Patient reports no new complaints.      OBJECTIVE      13 min Therapeutic Exercise:  [x] See flow sheet :   []  Pelvic floor strengthening                 []  Pelvic floor downtraining  []  Quality pelvic floor contractions       []  Relaxation techniques  []  Urge suppression exercises  []  Other:  Rationale: increase ROM, increase strength and improve coordination  to improve the patients ability to increase ease with ADLs       10 min Therapeutic Activity:  [x]  See flow sheet :  Swiss ball marches  QP cat/cow    []  Increase Tissue extensibility        []  Assess fiber intake    []  Assess voiding habits  []  Assess bowel habits  []  Other:   Rationale: increase strength and improve coordination  to improve the patients ability to improve ADL ease      10 min Neuromuscular Re-education:  [x]  See flow sheet :   []  Pelvic floor strengthening                 []  Pelvic floor downtraining  []  Quality pelvic floor contractions       []  Relaxation techniques  []  Urge suppression exercises  []  Other:  Rationale: increase strength, improve coordination, improve balance and increase proprioception  to improve the patients ability to improve pelvic floor awareness and coordination      With   [] TE   [] TA   [] neuro  [] manual   [] other: Patient Education: [x] Review HEP    [] Progressed/Changed HEP based on:   [] positioning   [] body mechanics   [] transfers   [] heat/ice application    [] other:      Other Objective/Functional Measures:       Pain Level (0-10 scale) post treatment: 6    ASSESSMENT/Changes in Function:   Patient has made slow and minimal progress towards goals. Patient's pain has remains moderate-high. Patient also continues with limited TA awareness in standing despite multiple sessions focusing on core strength. Patient is 37 weeks pregnant today. Most likely discharge at next visit. Patient will continue to benefit from skilled PT services to modify and progress therapeutic interventions, address functional mobility deficits, address ROM deficits, address strength deficits, analyze and address soft tissue restrictions, analyze and cue movement patterns, analyze and modify body mechanics/ergonomics, assess and modify postural abnormalities and instruct in home and community integration to attain remaining goals. []  See Plan of Care  []  See progress note/recertification  []  See Discharge Summary         Progress towards goals / Updated goals:  Short Term Goals: To be accomplished in 2 weeks:  1. Patient will be independent and compliant with HEP to progress toward goals and restore functional mobility. Eval Status: issued at eval  Current: advised pt get a SB for use as a chair at her home office to relieve some pressure and pain 1/20/22  Current:  Patient has ordered a SB 1/24/22  Current:  Patient reported that she received the Swiss ball yesterday. Benita Clark has not used it for the HEP yet.  1/27/2022 Progressing  2/24/22: slow progress, patient reports HEP semi-compliance  3/9/2022 Patient reports good compliance with HEP.       2. Patient will report pain no greater than a 6-7/10 in order to increase ease with completion of ADL's.   Eval Status: pain at worst:10/10 pain at best is a 5/10  Current:  Pain at worst 9/10 and best 5/10  1/24/22  Current:  Pain at worst 8/10 when she was trying to get comfortable in bed and best 4/10 1/27/2022 progressing  2/24/22: regression, worst pain: 9/10 pain   3/3/2022  Patient reports pain as 7/10.  She reports that the baby has dropped which has increased her pain.  Progressing  3/16/22: not met, patient reports 10/10 pain with recent move        Long Term Goals: To be accomplished in 8 weeks:  1. Patient will improve FOTO score to 58 points to demonstrate improvement in functional status.                   Status at IE:26                    Current: Same as IE                     Current:  FOTO= 50                    2/24/22: slight regression, score to 48      2.   Pt will report the ability to ambulate for at least 30 minutes with pain less than or equal to 5/10 in order to improve ease with completion of grocery shopping.   Eval Status: pt reports the ability to walk for 15-20 minutes and being in significant pain after that.    Current:  Pt reports the ability to ambulate 15-20 minutes before having significant pain.  1/27/2022 2/24/22: MET, patient reports able to ambulate x1 hour before significant increase in pain     3.   Pt will improve strength by at least . 5 grade MMT in order to increase ease with lifting activities.   Eval Status: Strength (MMT): 5/5 with exceptions indicated below.                                             Hip Left (1-5) Right (1-5) Left   1/27/2022 Right   1/27/2022 Left  2/24/22 Right  2/24/22   Hip Flexion 3+ pain 3 pain 4 pain 4 Not tested due to increase in pain Not tested due to increase in pain   Hip Extension( tested in s/l) 3+ 3- pain 3 pain         Hip ABD 4 3 pain 3 pain 3 NT NT   Hip ADD 4- 4- increased right groin/ant hip pain 4-  pain 4- pain NT NT   Hip ER   4+, increased right groin/ant hip pain 5  No pain 5  No pain NT NT   Hip IR increased left groin/ant hip pain 4- increased right groin/ant hip pain.  4-  No pain 4-  No pain NT NT         4.   Patient will improve pain in daily activities to no greater than 3/10 at worst to improve activity tolerance and restore prior level of function. Eval Status: 10/10 at worst  Current:  Pain at worst 8/10 when she was trying to get comfortable in bed and best 4/10 1/27/2022 progressing   Current:  Pain at worst 8/10 when she was trying to get comfortable in bed and best 2/10  2/15/2022 Progressing  2/24/22: progressing, 4/10 average pain with ADLs  3/15/22: not met, patient reports 10/10 pain with recent move     5. Patient will report the ability to sit for at least 1 hour with pain no greater than 5/10 in order to complete work duties with increased ease.   Eval status: pt reports she is unable to sit more than 15-20 minutes.   Current:  Patient reports being able to tolerate sitting on the swiss ball for more than 1 hour with pain no greater than 5/10  1/27/2022 Goal Met        PLAN  []  Upgrade activities as tolerated     [x]  Continue plan of care  []  Update interventions per flow sheet       []  Discharge due to:_  []  Other:_      Faraz Dry 3/16/2022  9:44 AM    Future Appointments   Date Time Provider Thuan Shrestha   3/16/2022  1:15 PM Francisco J Chaudhary [facility]   3/23/2022  9:30 AM Francisco J Chaudhary [facility]   3/30/2022 12:30 PM Leyla Gee, PT [facility]

## 2022-03-18 ENCOUNTER — HOSPITAL ENCOUNTER (EMERGENCY)
Age: 27
Discharge: HOME | End: 2022-03-18
Attending: OBSTETRICS & GYNECOLOGY | Admitting: OBSTETRICS & GYNECOLOGY
Payer: COMMERCIAL

## 2022-03-18 VITALS
TEMPERATURE: 98.2 F | RESPIRATION RATE: 20 BRPM | WEIGHT: 222 LBS | HEIGHT: 62 IN | BODY MASS INDEX: 40.85 KG/M2 | OXYGEN SATURATION: 99 %

## 2022-03-18 PROBLEM — Z34.90 PREGNANCY: Status: ACTIVE | Noted: 2022-01-07

## 2022-03-18 PROBLEM — Z3A.36 36 WEEKS GESTATION OF PREGNANCY: Status: ACTIVE | Noted: 2022-03-18

## 2022-03-18 PROBLEM — N30.10 INTERSTITIAL CYSTITIS: Status: ACTIVE | Noted: 2022-01-07

## 2022-03-18 PROBLEM — O47.9 IRREGULAR CONTRACTIONS: Status: ACTIVE | Noted: 2022-03-18

## 2022-03-18 PROCEDURE — 99285 EMERGENCY DEPT VISIT HI MDM: CPT

## 2022-03-18 PROCEDURE — 59025 FETAL NON-STRESS TEST: CPT

## 2022-03-18 NOTE — DISCHARGE INSTRUCTIONS
Patient Education        Linda Gaudier Contractions: Care Instructions  Your Care Instructions     Racine Kaplan contractions prepare your uterus for labor. Think of them as a \"warm-up\" exercise that your body does. You may begin to feel them between the 28th and 30th weeks of your pregnancy. But they start as early as the 20th week. Racine Kaplan contractions usually occur more often during the ninth month. They may go away when you are active and return when you rest. These contractions are like mild contractions of true labor, but they occur less often. (You feel fewer than 8 in an hour.) They don't cause your cervix to open. It may be hard for you to tell the difference between Linda Gaudier contractions and true labor, especially in your first pregnancy. Follow-up care is a key part of your treatment and safety. Be sure to make and go to all appointments, and call your doctor if you are having problems. It's also a good idea to know your test results and keep a list of the medicines you take. How can you care for yourself at home? · Try a warm bath to help relieve muscle tension and reduce pain. · Change positions every 30 minutes. Take breaks if you must sit for a long time. Get up and walk around. · Drink plenty of water. · Taking short walks may help you feel better. Your doctor needs to check any contractions that are getting stronger or closer together. Where can you learn more? Go to http://www.gray.com/  Enter Z402 in the search box to learn more about \"Racine Kaplan Contractions: Care Instructions. \"  Current as of: June 16, 2021               Content Version: 13.2  © 2925-3826 [employer]. Care instructions adapted under license by [employer] (which disclaims liability or warranty for this information).  If you have questions about a medical condition or this instruction, always ask your healthcare professional. Maria Boyle disclaims any warranty or liability for your use of this information. Patient Education   Patient Education   Patient Education        Week 40 of Your Pregnancy: Care Instructions  Overview     You are near the end of your pregnancy--and you're probably pretty uncomfortable. It may be harder to walk around. Lying down probably isn't comfortable either. You may have trouble getting to sleep or staying asleep. Most babies are born between 40 and 41 weeks. This is a good time to think about packing a bag for the hospital with items you'll need. Then you'll be ready when labor starts. Follow-up care is a key part of your treatment and safety. Be sure to make and go to all appointments, and call your doctor if you are having problems. It's also a good idea to know your test results and keep a list of the medicines you take. How can you care for yourself at home? Learn about breastfeeding  · Breastfeeding is best for your baby and good for you. · Breast milk has antibodies to help your baby fight infections. · If you breastfeed, you may lose weight faster. That's because making milk burns calories. · Learning the best ways to hold your baby will make breastfeeding easier. · Sometimes breastfeeding can make partners feel left out. If you have a partner, plan how you can care for your baby together. For example, your partner can bathe and diaper the baby. You can snuggle together when you breastfeed. · You may want to learn how to use a breast pump and store your milk. · If you choose to bottle feed, make the feeding feel like breastfeeding so you can bond with your baby. Always hold your baby and the bottle. Don't prop bottles or let your baby fall asleep with a bottle. Learn about crying  · It's common for babies to cry for 1 to 3 hours a day. Some cry more, and some cry less. · Babies don't cry to make you upset or because you're a bad parent. · Crying is how your baby communicates.  Your baby may be hungry; have gas; need a diaper change; or feel cold, warm, tired, lonely, or tense. Sometimes babies cry for unknown reasons. · If you respond to your baby's needs, your baby will learn to trust you. · Try to stay calm when your baby cries. Your baby may get more upset if they sense that you are upset. Know how to care for your   · Your baby's umbilical cord stump will drop off on its own, usually between 1 and 2 weeks. To care for your baby's umbilical cord area:  ? Clean the area at the bottom of the cord 2 or 3 times a day. ? Pay special attention to the area where the cord attaches to the skin. ? Keep the diaper folded below the cord. ? Use a damp washcloth or cotton ball to sponge bathe your baby until the stump has come off. · Your baby's first dark stool is called meconium. After the meconium is passed, your baby will develop their own bowel pattern. ? Some babies, especially  babies, have several bowel movements a day. Others have one or two a day, or one every 2 to 3 days. ?  babies often have loose, yellow stools. Formula-fed babies have more formed stools. ? If your baby's stools look like little pellets, your baby is constipated. After 2 days of constipation, call your baby's doctor. · If your baby will be circumcised, you can care for your baby at home. ? Gently rinse your baby's penis with warm water after every diaper change. Don't try to remove the film that forms on the penis. This film will go away on its own. Pat dry. ? Put petroleum ointment, such as Vaseline, on the area of the diaper that will touch your baby's penis. This will keep the diaper from sticking to your baby. ? Ask the doctor about giving your baby acetaminophen (Tylenol) for pain. Where can you learn more? Go to http://www.gray.com/  Enter N257 in the search box to learn more about \"Week 37 of Your Pregnancy: Care Instructions. \"  Current as of:  2021               Content Version: 13.2  © 1470-4527 [employer]. Care instructions adapted under license by [employer] (which disclaims liability or warranty for this information). If you have questions about a medical condition or this instruction, always ask your healthcare professional. [address] any warranty or liability for your use of this information. Counting Your Baby's Kicks: Care Instructions  Overview     Counting your baby's kicks is one way your doctor can tell that your baby is healthy. Most women--especially in a first pregnancy--feel their baby move for the first time between 16 and 22 weeks. The movement may feel like flutters rather than kicks. Your baby may move more at certain times of the day. When you are active, you may notice less kicking than when you are resting. At your prenatal visits, your doctor will ask whether the baby is active. In your last trimester, your doctor may ask you to count the number of times you feel your baby move. Follow-up care is a key part of your treatment and safety. Be sure to make and go to all appointments, and call your doctor if you are having problems. It's also a good idea to know your test results and keep a list of the medicines you take. How do you count fetal kicks? · A common method of checking your baby's movement is to note the length of time it takes to count ten movements (such as kicks, flutters, or rolls). · Pick your baby's most active time of day to count. This may be any time from morning to evening. · If you don't feel 10 movements in an hour, have something to eat or drink and count for another hour. If you don't feel at least 10 movements in the 2-hour period, call your doctor. When should you call for help?    Call your doctor now or seek immediate medical care if:    · You noticed that your baby has stopped moving or is moving much less than normal.   Watch closely for changes in your health, and be sure to contact your doctor if you have any problems. Where can you learn more? Go to http://www.gray.com/  Enter [id number] in the search box to learn more about \"Counting Your Baby's Kicks: Care Instructions. \"  Current as of: June 16, 2021               Content Version: 13.2  © 9282-7830 [employer]. Care instructions adapted under license by "[employer]" (which disclaims liability or warranty for this information). If you have questions about a medical condition or this instruction, always ask your healthcare professional. [place] [address] any warranty or liability for your use of this information. Learning About Pregnancy  Your Care Instructions     Your health in the early weeks of your pregnancy is particularly important for your baby's health. Take good care of yourself. Anything you do that harms your body can also harm your baby. Make sure to go to all of your doctor appointments. Regular checkups will help keep you and your baby healthy. How can you care for yourself at home? Diet    · Eat a balanced diet. Make sure your diet includes plenty of beans, peas, and leafy green vegetables.     · Do not skip meals or go for many hours without eating. If you are nauseated, try to eat a small, healthy snack every 2 to 3 hours.     · Do not eat fish that has a high level of mercury, such as shark, swordfish, or mackerel. Do not eat more than one can of tuna each week.     · Drink plenty of fluids. If you have kidney, heart, or liver disease and have to limit fluids, talk with your doctor before you increase the amount of fluids you drink.     · Cut down on caffeine, such as coffee, tea, and cola.     · Do not drink alcohol, such as beer, wine, or hard liquor.     · Take a multivitamin that contains at least 400 micrograms (mcg) of folic acid to help prevent birth defects.  Fortified cereal and whole wheat bread are good additional sources of folic acid.     · Increase the calcium in your diet. Try to drink a quart of skim milk each day. You may also take calcium supplements and choose foods such as cheese and yogurt. Lifestyle    · Make sure you go to your follow-up appointments.     · Get plenty of rest. You may be unusually tired while you are pregnant.     · Get at least 30 minutes of exercise on most days of the week. Walking is a good choice. If you have not exercised in the past, start out slowly. Take several short walks each day.     · Do not smoke. If you need help quitting, talk to your doctor about stop-smoking programs. These can increase your chances of quitting for good.     · Do not touch cat feces or litter boxes. Also, wash your hands after you handle raw meat, and fully cook all meat before you eat it. Wear gloves when you work in the yard or garden, and wash your hands well when you are done. Cat feces, raw or undercooked meat, and contaminated dirt can cause an infection that may harm your baby or lead to a miscarriage.     · Avoid things that can make your body too hot and may be harmful to your baby, such as a hot tub or sauna. Or talk with your doctor before doing anything that raises your body temperature. Your doctor can tell you if it's safe.     · Avoid chemical fumes, paint fumes, or poisons.     · Do not use illegal drugs, marijuana, or alcohol. Medicines    · Review all of your medicines with your doctor. Some of your routine medicines may need to be changed to protect your baby.     · Use acetaminophen (Tylenol) to relieve minor problems, such as a mild headache or backache or a mild fever with cold symptoms. Do not use nonsteroidal anti-inflammatory drugs (NSAIDs), such as ibuprofen (Advil, Motrin) or naproxen (Aleve), unless your doctor says it is okay.     · Do not take two or more pain medicines at the same time unless the doctor told you to.  Many pain medicines have acetaminophen, which is Tylenol. Too much acetaminophen (Tylenol) can be harmful.     · Take your medicines exactly as prescribed. Call your doctor if you think you are having a problem with your medicine. To manage morning sickness    · If you feel sick when you first wake up, try eating a small snack (such as crackers) before you get out of bed. Allow some time to digest the snack, and then get out of bed slowly.     · Do not skip meals or go for long periods without eating. An empty stomach can make nausea worse.     · Eat small, frequent meals instead of three large meals each day.     · Drink plenty of fluids.     · Eat foods that are high in protein but low in fat.     · If you are taking iron supplements, ask your doctor if they are necessary. Iron can make nausea worse.     · Avoid any smells, such as coffee, that make you feel sick.     · Get lots of rest. Morning sickness may be worse when you are tired. Follow-up care is a key part of your treatment and safety. Be sure to make and go to all appointments, and call your doctor if you are having problems. It's also a good idea to know your test results and keep a list of the medicines you take. Where can you learn more? Go to http://www.gray.com/  Enter M615 in the search box to learn more about \"Learning About Pregnancy. \"  Current as of: June 16, 2021               Content Version: 13.2  © 4653-6330 [employer]. Care instructions adapted under license by [employer] (which disclaims liability or warranty for this information). If you have questions about a medical condition or this instruction, always ask your healthcare professional. [address] any warranty or liability for your use of this information.

## 2022-03-18 NOTE — ROUTINE PROCESS
notified of pt arrival, SVE x2, reactive nst, contraction pattern, negative nitrazine, c/o pain/pressure. Discharge order received.

## 2022-03-18 NOTE — H&P
Ostetrical History and Physical    Subjective:     Date of Admission: 3/18/2022    Patient is a 23 y.o.   female admitted with irreg contractions at 36 5/7 wks. .    For Obstetric history, see dee dee.    For Review of Systems, see prenatal    Past Medical History:   Diagnosis Date    Abnormal Papanicolaou smear of cervix     ,     Interstitial cystitis     Interstitial cystitis     Nicotine vapor product user       Past Surgical History:   Procedure Laterality Date    HX REFRACTIVE SURGERY        Prior to Admission medications    Medication Sig Start Date End Date Taking? Authorizing Provider   prenatal multivit-ca-min-fe-fa (Prenatal Vitamin) tab Take 1 Tablet by mouth daily. 21  Yes CRISTINO [place]   levonorgestreL (Mirena) 20 mcg/24 hours (5 yrs) 52 mg IUD 1 Device by IntraUTERine route once. Patient not taking: Reported on 2022    Other, MD James     No Known Allergies   Social History     Tobacco Use    Smoking status: Never Smoker    Smokeless tobacco: Never Used   Substance Use Topics    Alcohol use: Not Currently     Comment: social      No family history on file. Objective:     Temperature 98.2 °F (36.8 °C), resp. rate 20, height 5' 1.5\" (1.562 m), weight 100.7 kg (222 lb), last menstrual period 2021, SpO2 99 %. Temp (24hrs), Av.2 °F (36.8 °C), Min:98.2 °F (36.8 °C), Max:98.2 °F (36.8 °C)        No intake/output data recorded. No intake/output data recorded. HEENT: No pallor, no jaundice, Thyroid and throat normal  RESPIRATORY: Clear to A & P  CVS: pulse reg, HS normal  ABDOMEN: Gravid. Vertex. EFW=6.5lb +-1lb. No abnormal tenderness. Pelvic: Cervix 1,   Effaced: 50%  Station:  -3  Data Review:   No results found for this or any previous visit (from the past 24 hour(s)).   Monitor:  Reactivity:present Variability:present Baseline:within normal limits    Assessment:     Active Problems:    Interstitial cystitis (2022)      Irregular contractions (3/18/2022)      36 weeks gestation of pregnancy (3/18/2022)    No change in cervix in >1 hour with walking    Plan:     Home       Disposition: Home    Type of admit:Out Paitent    I saw and examined patient.     Signed By: Peggy Stout MD                         March 18, 2022

## 2022-03-19 PROBLEM — Z3A.27 27 WEEKS GESTATION OF PREGNANCY: Status: ACTIVE | Noted: 2022-01-07

## 2022-03-19 PROBLEM — M54.9 BACK PAIN: Status: ACTIVE | Noted: 2022-01-07

## 2022-03-20 PROBLEM — R10.2 PELVIC PAIN: Status: ACTIVE | Noted: 2022-01-07

## 2022-03-23 ENCOUNTER — HOSPITAL ENCOUNTER (OUTPATIENT)
Dept: PHYSICAL THERAPY | Age: 27
Discharge: HOME | End: 2022-03-23
Payer: COMMERCIAL

## 2022-03-23 PROCEDURE — 97110 THERAPEUTIC EXERCISES: CPT

## 2022-03-23 PROCEDURE — 97530 THERAPEUTIC ACTIVITIES: CPT

## 2022-03-23 NOTE — PROGRESS NOTES
[facility]  [address], [address] [facility]  Ph [phone]  Fx [phone]    Physical Therapy Progress Note  Patient name: Ronnie Beyer Start of Care: 12/29/2021   Referral source: Delfina Gonzalez MD DLM: 9/75/2976                Medical Diagnosis: Left hip pain [M25.552]  Right hip pain [M25.551]    Onset Date:12/8/21                Treatment Diagnosis: left and right hip pain, SIJ                                              ICD-10: M25.552, M25.551   Prior Hospitalization: see medical history Provider#: [id number]   Medications: Verified on Patient summary List   Comorbidities/PMHx/Surgical Hx:   [x]????Other: interstitial cystitis dx in 2014. First pregnancy- at 25 weeks right now: due date second week April    Prior level of function: functionally independent, no AD, moderately active lifestyle           Visits from Start of Care: 11    Missed Visits: 2    Updated Goals/Measure of Progress: To be achieved in 1 weeks:  Short Term Goals: To be accomplished in 2 weeks:  1. Patient will be independent and compliant with HEP to progress toward goals and restore functional mobility. Eval Status: issued at [facility]  Current: advised pt get a SB for use as a chair at her home office to relieve some pressure and pain 1/20/22  Current:  Patient has ordered a SB 1/24/22  Current:  Patient reported that she received the Swiss ball yesterday. Bernarda Moreno has not used it for the HEP yet.  1/27/2022 Progressing  2/24/22: slow progress, patient reports HEP semi-compliance  3/9/2022 Patient reports good compliance with HEP.     3/23/2022 Patient reports good compliance with the HEP     2. Patient will report pain no greater than a 6-7/10 in order to increase ease with completion of ADL's.   Eval Status: pain at worst:10/10 pain at best is a 5/10  Current:  Pain at worst 9/10 and best 5/10  1/24/22  Current:  Pain at worst 8/10 when she was trying to get comfortable in bed and best 4/10 1/27/2022 progressing  2/24/22: regression, worst pain: 9/10 pain   3/3/2022  Patient reports pain as 7/10.  She reports that the baby has dropped which has increased her pain.  Progressing  3/16/22: not met, patient reports 10/10 pain with recent move   3/23/22  Patient reports pain at worst as 5/10. Progressing     Long Term Goals: To be accomplished in  1weeks:  1. Patient will improve FOTO score to 58 points to demonstrate improvement in functional status.                   Status at IE:26                    Current: Same as IE                     Current:  FOTO= 50                    2/24/22: slight regression, score to 48                     3/23/22  55 progression     2.   Pt will report the ability to ambulate for at least 30 minutes with pain less than or equal to 5/10 in order to improve ease with completion of grocery shopping.   Eval Status: pt reports the ability to walk for 15-20 minutes and being in significant pain after that.    Current:  Pt reports the ability to ambulate 15-20 minutes before having significant pain.  1/27/2022 2/24/22: MET, patient reports able to ambulate x1 hour before significant increase in pain     3.   Pt will improve strength by at least . 5 grade MMT in order to increase ease with lifting activities.   Eval Status: Strength (MMT): 5/5 with exceptions indicated below.                                             Hip Left (1-5) Right (1-5) Left   1/27/2022 Right   1/27/2022 Left  2/24/22 Right  2/24/22   Hip Flexion 3+ pain 3 pain 4 pain 4 Not tested due to increase in pain Not tested due to increase in pain   Hip Extension( tested in s/l) 3+ 3- pain 3 pain         Hip ABD 4 3 pain 3 pain 3 NT NT   Hip ADD 4- 4- increased right groin/ant hip pain 4-  pain 4- pain NT NT   Hip ER   4+, increased right groin/ant hip pain 5  No pain 5  No pain NT NT   Hip IR increased left groin/ant hip pain 4- increased right groin/ant hip pain.  4-  No pain 4-  No pain NT NT   3/23/2022 Defer to after birth. D/C GOAL      4.   Patient will improve pain in daily activities to no greater than 3/10 at worst to improve activity tolerance and restore prior level of function. Eval Status: 10/10 at worst  Current:  Pain at worst 8/10 when she was trying to get comfortable in bed and best 4/10 1/27/2022 progressing   Current:  Pain at worst 8/10 when she was trying to get comfortable in bed and best 2/10  2/15/2022 Progressing  2/24/22: progressing, 4/10 average pain with ADLs  3/15/22: not met, patient reports 10/10 pain with recent move   3/23/22  Patient reports pain at worst as 5/10. Progressing     5. Patient will report the ability to sit for at least 1 hour with pain no greater than 5/10 in order to complete work duties with increased ease. Eval status: pt reports she is unable to sit more than 15-20 minutes.   Current:  Patient reports being able to tolerate sitting on the swiss ball for more than 1 hour with pain no greater than 5/10  1/27/2022 Goal Met           Summary of Care/ Key Functional Changes: Patient is a 23year old female who is approximately 38 weeks into her pregnancy. She has been seen by physical therapy eleven times for symphysis pubis dysfunction. She reports decreased pain and increased ability to walk without discomfort. Patient has been instructed in perineal massage but reports this as too painful due to her IC. She may benefit from one more session to address remaining deficits and prepare for birth.       ASSESSMENT/RECOMMENDATIONS:  [x]Continue therapy per initial plan/protocol at a frequency of  1 x per week for 1 weeks  []Continue therapy with the following recommended changes:_____________________ _____________________________ ________________________________________  []Discontinue therapy progressing towards or have reached established goals  []Discontinue therapy due to lack of appreciable progress towards goals  []Discontinue therapy due to lack of attendance or compliance  []Await Physician's recommendations/decisions regarding therapy  []Other:________________________________________________________________    Thank you for this referral.   Janus Hodgkins, PT 3/23/2022 2:29 PM

## 2022-03-23 NOTE — PROGRESS NOTES
PT DAILY TREATMENT NOTE    Patient Name: Ketty Bean  Date:3/23/2022  : 1995  [x]  Patient  Verified  [facility] [address] / Plan: [address] / Product Type: Managed Care Medicaid /    In time: 1755  Out time:1146  Total Treatment Time (min): 33  Total Timed Codes (min): 33  1:1 Treatment Time ([facility] only): 33   Visit #: 11 of 12    Treatment Dx: Left hip pain [M25.552]  Right hip pain [M25.551]    SUBJECTIVE  Pain Level (0-10 scale): 3/10  Any medication changes, allergies to medications, adverse drug reactions, diagnosis change, or new procedure performed?: [x] No    [] Yes (see summary sheet for update)  Subjective functional status/changes:   [] No changes reported  Patient reports decreased pain with ambulation. She reports feeling more energetic. OBJECTIVE    22 min Therapeutic Exercise:  [x] See flow sheet :    Rationale: increase strength, improve coordination and decrease pubic symphysis pain to improve the patients ability to allow her to perform ADLs with increased ease    11 min Therapeutic Activity:  [x] See flow sheet : Assessment of all goals   Rationale: All goals were assessed to determine the efficacy of the treatments      With   [] TE   [] TA   [] neuro   [] other: Patient Education: [x] Review HEP    [] Progressed/Changed HEP based on:   [] positioning   [] body mechanics   [] transfers   [] heat/ice application    [] other:      Other Objective/Functional Measures: FOTO = 55    Pain Level (0-10 scale) post treatment: 3/10    ASSESSMENT/Changes in Function: Patient tolerated today's session well with no c/o pain. Patient demonstrated understanding of today's instruction, education, and recommendations. Patient was instructed on use of a massage roller stick. Patient is progressing appropriately towards goals.     Patient will continue to benefit from skilled PT services to modify and progress therapeutic interventions, address functional mobility deficits, address ROM deficits, address strength deficits, analyze and address soft tissue restrictions, analyze and cue movement patterns and analyze and modify body mechanics/ergonomics to attain remaining goals. [x]  See Plan of Care  []  See progress note/recertification  []  See Discharge Summary         Progress towards goals / Updated goals:  Short Term Goals: To be accomplished in 2 weeks:  1. Patient will be independent and compliant with HEP to progress toward goals and restore functional mobility. Eval Status: issued at [facility]  Current: advised pt get a SB for use as a chair at her home office to relieve some pressure and pain 1/20/22  Current:  Patient has ordered a SB 1/24/22  Current:  Patient reported that she received the Swiss ball yesterday. Bernarda Moreno has not used it for the HEP yet.  1/27/2022 Progressing  2/24/22: slow progress, patient reports HEP semi-compliance  3/9/2022 Patient reports good compliance with HEP.     3/23/2022 Patient reports good compliance with the HEP     2. Patient will report pain no greater than a 6-7/10 in order to increase ease with completion of ADL's. Eval Status: pain at worst:10/10 pain at best is a 5/10  Current:  Pain at worst 9/10 and best 5/10  1/24/22  Current:  Pain at worst 8/10 when she was trying to get comfortable in bed and best 4/10 1/27/2022 progressing  2/24/22: regression, worst pain: 9/10 pain   3/3/2022  Patient reports pain as 7/10.  She reports that the baby has dropped which has increased her pain.  Progressing  3/16/22: not met, patient reports 10/10 pain with recent move   3/23/22  Patient reports pain at worst as 5/10. Progressing     Long Term Goals: To be accomplished in 8 weeks:  1. Patient will improve FOTO score to 58 points to demonstrate improvement in functional status.                     Status at IE:26                    Current: Same as IE                     Current:  FOTO= 50                    2/24/22: slight regression, score to 48                     3/23/22  55 progression     2.   Pt will report the ability to ambulate for at least 30 minutes with pain less than or equal to 5/10 in order to improve ease with completion of grocery shopping.   Eval Status: pt reports the ability to walk for 15-20 minutes and being in significant pain after that.    Current:  Pt reports the ability to ambulate 15-20 minutes before having significant pain.  1/27/2022 2/24/22: MET, patient reports able to ambulate x1 hour before significant increase in pain     3.   Pt will improve strength by at least . 5 grade MMT in order to increase ease with lifting activities. Eval Status: Strength (MMT): 5/5 with exceptions indicated below.                                             Hip Left (1-5) Right (1-5) Left   1/27/2022 Right   1/27/2022 Left  2/24/22 Right  2/24/22   Hip Flexion 3+ pain 3 pain 4 pain 4 Not tested due to increase in pain Not tested due to increase in pain   Hip Extension( tested in s/l) 3+ 3- pain 3 pain         Hip ABD 4 3 pain 3 pain 3 NT NT   Hip ADD 4- 4- increased right groin/ant hip pain 4-  pain 4- pain NT NT   Hip ER   4+, increased right groin/ant hip pain 5  No pain 5  No pain NT NT   Hip IR increased left groin/ant hip pain 4- increased right groin/ant hip pain.  4-  No pain 4-  No pain NT NT   3/23/2022  Defer to after birth. D/C GOAL      4.   Patient will improve pain in daily activities to no greater than 3/10 at worst to improve activity tolerance and restore prior level of function. Eval Status: 10/10 at worst  Current:  Pain at worst 8/10 when she was trying to get comfortable in bed and best 4/10 1/27/2022 progressing   Current:  Pain at worst 8/10 when she was trying to get comfortable in bed and best 2/10  2/15/2022 Progressing  2/24/22: progressing, 4/10 average pain with ADLs  3/15/22: not met, patient reports 10/10 pain with recent move   3/23/22  Patient reports pain at worst as 5/10. Progressing     5. Patient will report the ability to sit for at least 1 hour with pain no greater than 5/10 in order to complete work duties with increased ease.   Eval status: pt reports she is unable to sit more than 15-20 minutes.   Current:  Patient reports being able to tolerate sitting on the swiss ball for more than 1 hour with pain no greater than 5/10  1/27/2022 Goal Met           PLAN  []  Upgrade activities as tolerated     [x]  Continue plan of care  []  Update interventions per flow sheet       []  Discharge due to:_  []  Other:_      Janus Hodgkins, PT 3/23/2022  11:17 AM    Future Appointments   Date Time Provider Thuan Shrestha   3/30/2022 12:30 PM Tang Ferro, PT [facility]

## 2022-03-24 PROBLEM — Z3A.36 36 WEEKS GESTATION OF PREGNANCY: Status: ACTIVE | Noted: 2022-03-18

## 2022-03-24 PROBLEM — O47.9 IRREGULAR CONTRACTIONS: Status: ACTIVE | Noted: 2022-03-18

## 2022-03-30 ENCOUNTER — HOSPITAL ENCOUNTER (OUTPATIENT)
Dept: PHYSICAL THERAPY | Age: 27
Discharge: HOME | End: 2022-03-30
Payer: COMMERCIAL

## 2022-03-30 PROCEDURE — 97110 THERAPEUTIC EXERCISES: CPT

## 2022-03-30 PROCEDURE — 97535 SELF CARE MNGMENT TRAINING: CPT

## 2022-03-30 PROCEDURE — 97530 THERAPEUTIC ACTIVITIES: CPT

## 2022-03-30 NOTE — PROGRESS NOTES
PF DAILY TREATMENT NOTE    Patient Name: Daily Zavala  Date:3/30/2022  : 1995  [x]  Patient  Verified  Payor: [address]ina Montiel [address] / Plan: [address] / Product Type: Managed Care Medicaid /    In time: 2774 Out time:115  Total Treatment Time (min): 45    For [facility]BS only  Total Timed Codes (min): 45  Of Timed Code minutes, 1:1 Treatment Time: 39     Visit #: 12 of 12    Treatment Area: Left hip pain [M25.552]  Right hip pain [M25.551]    SUBJECTIVE  Pain Level (0-10 scale): 0/10 but reports 3/10 when using the bathroom or sitting for over an hour  Any medication changes, allergies to medications, adverse drug reactions, diagnosis change, or new procedure performed?: [x] No    [] Yes (see summary sheet for update)  Subjective functional status/changes:   [x] No changes reported    Patient reports decreased pain and is preparing for the birth of her child.       OBJECTIVE        22 min Therapeutic Exercise:  [x] See flow sheet :   []  Pelvic floor strengthening                 [x]  Pelvic floor downtraining  []  Quality pelvic floor contractions       [x]  Relaxation techniques  []  Urge suppression exercises  []  Other:   Rationale: increase ROM  to improve the patients ability to birth her child with less discomfort       15 min Therapeutic Activity:  []  See flow sheet :  Delivery positions    []  Increase Tissue extensibility        []  Assess fiber intake    []  Assess voiding habits  []  Assess bowel habits  []  Other:   Rationale: increase ROM and improve coordination  to improve the patients ability to decrease discomfort during the birth of her baby        8 min Self Care: Labor positions, stretching for adductors , hamstrings,     Rationale:    increase ROM and improve coordination to improve the patients ability to birth her baby with reduced discomfort         With   [] TE   [] TA   [] neuro  [] manual  [] self care   [] other: Patient Education: [x] Review HEP    [] Progressed/Changed HEP based on:   [] positioning   [] body mechanics   [] transfers   [] heat/ice application    [] other:        Pain Level (0-10 scale) post treatment: 0/10    ASSESSMENT/Changes in Function:   Patient is a 24 y/o female who has attended 12 pelvic floor physical therapy appointments. She has progressed well and no longer reports pain in the pubic symphysis region. She does report pain with voiding and defecation. Patient has areas of tenderness in SI, glut med, piriformis region. She has been instructed on using a hard ball (e.g. lacrosse ball) to perform trigger point release on these areas. Patient was instructed on perineal massage and labor positions. Patient reports feeling confident in continuing to use the HEP and management tools to progress at home and will be discharged from physical therapy today. [x]  Decrease pain [] No change [x]  Improving [] Resolved            []  See Plan of Care  []  See progress note/recertification  [x]  See Discharge Summary         Progress towards goals / Updated goals:  Short Term Goals: To be accomplished in 2 weeks:  1. Patient will be independent and compliant with HEP to progress toward goals and restore functional mobility. Eval Status: issued at [facility]  Current: advised pt get a SB for use as a chair at her home office to relieve some pressure and pain 1/20/22  Current:  Patient has ordered a SB 1/24/22  Current:  Patient reported that she received the Swiss ball yesterday. Tip Neff has not used it for the HEP yet.  1/27/2022 Progressing  2/24/22: slow progress, patient reports HEP semi-compliance  3/9/2022 Patient reports good compliance with HEP.     3/23/2022 Patient reports good compliance with the HEP  3/30/2022  Patient is compliant with her HEP  And management techniques. GOAL MET     2. Patient will report pain no greater than a 6-7/10 in order to increase ease with completion of ADL's.   Eval Status: pain at worst:10/10 pain at best is a 5/10  Current:  Pain at worst 9/10 and best 5/10  1/24/22  Current:  Pain at worst 8/10 when she was trying to get comfortable in bed and best 4/10 1/27/2022 progressing  2/24/22: regression, worst pain: 9/10 pain   3/3/2022  Patient reports pain as 7/10.  She reports that the baby has dropped which has increased her pain.  Progressing  3/16/22: not met, patient reports 10/10 pain with recent move   3/23/22  Patient reports pain at worst as 5/10. Progressing  3/30/22  Patient reports worst pain 3/10 when toileting GOAL MET          Long Term Goals: To be accomplished in  1weeks:  1. Patient will improve FOTO score to 58 points to demonstrate improvement in functional status.                   Status at IE:26                    Current: Same as IE                     Current:  FOTO= 50                    2/24/22: slight regression, score to 48                     3/23/22  55 progression                    3/30/2022 D/C goal     2.   Pt will report the ability to ambulate for at least 30 minutes with pain less than or equal to 5/10 in order to improve ease with completion of grocery shopping.   Eval Status: pt reports the ability to walk for 15-20 minutes and being in significant pain after that.    Current:  Pt reports the ability to ambulate 15-20 minutes before having significant pain.  1/27/2022 2/24/22: MET, patient reports able to ambulate x1 hour before significant increase in pain     3.   Pt will improve strength by at least . 5 grade MMT in order to increase ease with lifting activities.   Eval Status: Strength (MMT): 5/5 with exceptions indicated below.                                             Hip Left (1-5) Right (1-5) Left   1/27/2022 Right   1/27/2022 Left  2/24/22 Right  2/24/22   Hip Flexion 3+ pain 3 pain 4 pain 4 Not tested due to increase in pain Not tested due to increase in pain   Hip Extension( tested in s/l) 3+ 3- pain 3 pain         Hip ABD 4 3 pain 3 pain 3 NT NT   Hip ADD 4- 4- increased right groin/ant hip pain 4-  pain 4- pain NT NT   Hip ER   4+, increased right groin/ant hip pain 5  No pain 5  No pain NT NT   Hip IR increased left groin/ant hip pain 4- increased right groin/ant hip pain.  4-  No pain 4-  No pain NT NT   3/23/2022  Defer to after birth. D/C GOAL      4.   Patient will improve pain in daily activities to no greater than 3/10 at worst to improve activity tolerance and restore prior level of function. Eval Status: 10/10 at worst  Current:  Pain at worst 8/10 when she was trying to get comfortable in bed and best 4/10 1/27/2022 progressing   Current:  Pain at worst 8/10 when she was trying to get comfortable in bed and best 2/10  2/15/2022 Progressing  2/24/22: progressing, 4/10 average pain with ADLs  3/15/22: not met, patient reports 10/10 pain with recent move   3/23/22  Patient reports pain at worst as 5/10. Progressing   3/30/22  Patient reports worst pain 3/10 when toileting GOAL MET     5. Patient will report the ability to sit for at least 1 hour with pain no greater than 5/10 in order to complete work duties with increased ease. Eval status: pt reports she is unable to sit more than 15-20 minutes.   Current:  Patient reports being able to tolerate sitting on the swiss ball for more than 1 hour with pain no greater than 5/10  1/27/2022 Goal Met           PLAN  []  Upgrade activities as tolerated     []  Continue plan of care  []  Update interventions per flow sheet       [x]  Discharge due to:_met or progressing towards all goals  []  Other:_      Phyllis Camargo, PT 3/30/2022  12:33 PM    No future appointments.

## 2022-03-30 NOTE — PROGRESS NOTES
[facility]  [address], [address] [facility]  Ph [phone]  Fx [phone]    Physical Therapy Discharge Summary    Patient name: Ronnie Schulte Start of Care: 12/29/2021   Referral Lena Solomon MD COK: 6/65/3752                Medical Diagnosis: Left hip pain [M25.552]  Right hip pain [M25.551]    Onset Date:12/8/21                Treatment Diagnosis: left and right hip pain, SIJ                                              ICD-10: M25.552, M25.551   Prior Hospitalization: see medical history Provider#: [id number]   Medications: Verified on Patient summary List   Comorbidities/PMHx/Surgical Hx:   [x]??? ? ? Other: interstitial cystitis dx in 2014. First pregnancy- at 25 weeks right now: due date second week April    Prior level of function: functionally independent, no AD, moderately active lifestyle         Visits from Start of Care: 12    Missed Visits: 2    Reporting Period :   12/29/2021 to 3/30/2022    Goals/Measure of Progress:  Short Term Goals:   1. Patient will be independent and compliant with HEP to progress toward goals and restore functional mobility. Eval Status: issued at [facility]  Current: advised pt get a SB for use as a chair at her home office to relieve some pressure and pain 1/20/22  Current:  Patient has ordered a SB 1/24/22  Current:  Patient reported that she received the Swiss ball yesterday. Estrellita Bach has not used it for the HEP yet.  1/27/2022 Progressing  2/24/22: slow progress, patient reports HEP semi-compliance  3/9/2022 Patient reports good compliance with HEP.     3/23/2022 Patient reports good compliance with the HEP  3/30/2022  Patient is compliant with her HEP  And management techniques. GOAL MET     2. Patient will report pain no greater than a 6-7/10 in order to increase ease with completion of ADL's.   Eval Status: pain at worst:10/10 pain at best is a 5/10  Current:  Pain at worst 9/10 and best 5/10  1/24/22  Current:  Pain at worst 8/10 when she was trying to get comfortable in bed and best 4/10 1/27/2022 progressing  2/24/22: regression, worst pain: 9/10 pain   3/3/2022  Patient reports pain as 7/10.  She reports that the baby has dropped which has increased her pain.  Progressing  3/16/22: not met, patient reports 10/10 pain with recent move   3/23/22  Patient reports pain at worst as 5/10.  Progressing  3/30/22  Patient reports worst pain 3/10 when toileting GOAL MET           Long Term Goals:   1. Patient will improve FOTO score to 58 points to demonstrate improvement in functional status.                   Status at IE:26                    Current: Same as IE                     Current:  FOTO= 50                    2/24/22: slight regression, score to 48                     3/23/22  55 progression                    3/30/2022 D/C goal     2.   Pt will report the ability to ambulate for at least 30 minutes with pain less than or equal to 5/10 in order to improve ease with completion of grocery shopping.   Eval Status: pt reports the ability to walk for 15-20 minutes and being in significant pain after that.    Current:  Pt reports the ability to ambulate 15-20 minutes before having significant pain.  1/27/2022 2/24/22: MET, patient reports able to ambulate x1 hour before significant increase in pain     3.   Pt will improve strength by at least . 5 grade MMT in order to increase ease with lifting activities.   Eval Status: Strength (MMT): 5/5 with exceptions indicated below.                                             Hip Left (1-5) Right (1-5) Left   1/27/2022 Right   1/27/2022 Left  2/24/22 Right  2/24/22   Hip Flexion 3+ pain 3 pain 4 pain 4 Not tested due to increase in pain Not tested due to increase in pain   Hip Extension( tested in s/l) 3+ 3- pain 3 pain         Hip ABD 4 3 pain 3 pain 3 NT NT   Hip ADD 4- 4- increased right groin/ant hip pain 4-  pain 4- pain NT NT   Hip ER   4+, increased right groin/ant hip pain 5  No pain 5  No pain NT NT   Hip IR increased left groin/ant hip pain 4- increased right groin/ant hip pain.  4-  No pain 4-  No pain NT NT   3/23/2022  Defer to after birth.  D/C GOAL      4.   Patient will improve pain in daily activities to no greater than 3/10 at worst to improve activity tolerance and restore prior level of function. Eval Status: 10/10 at worst  Current:  Pain at worst 8/10 when she was trying to get comfortable in bed and best 4/10 1/27/2022 progressing   Current:  Pain at worst 8/10 when she was trying to get comfortable in bed and best 2/10  2/15/2022 Progressing  2/24/22: progressing, 4/10 average pain with ADLs  3/15/22: not met, patient reports 10/10 pain with recent move   3/23/22  Patient reports pain at worst as 5/10.  Progressing   3/30/22  Patient reports worst pain 3/10 when toileting GOAL MET     5. Patient will report the ability to sit for at least 1 hour with pain no greater than 5/10 in order to complete work duties with increased ease. Eval status: pt reports she is unable to sit more than 15-20 minutes.   Current:  Patient reports being able to tolerate sitting on the swiss ball for more than 1 hour with pain no greater than 5/10  1/27/2022 Goal Met       Assessment/ Summary of Care: Patient is a 22 y/o female who has attended 12 pelvic floor physical therapy appointments. She has progressed well and no longer reports pain in the pubic symphysis region. She does report pain with voiding and defecation. Patient has areas of tenderness in SI, glut med, piriformis region. She has been instructed on using a hard ball (e.g. lacrosse ball) to perform trigger point release on these areas. Patient was instructed on perineal massage and labor positions.   Patient reports feeling confident in continuing to use the HEP and management tools to progress at home and will be discharged from physical therapy today.         RECOMMENDATIONS:  [x]Discontinue therapy: [x]Patient has reached or is progressing toward set goals      []Patient is non-compliant or has abdicated      []Due to lack of appreciable progress towards set goals    Gerald Coleman, PT 3/30/2022 2:35 PM

## 2022-04-02 ENCOUNTER — ANESTHESIA (OUTPATIENT)
Dept: LABOR AND DELIVERY | Age: 27
DRG: 560 | End: 2022-04-02
Payer: COMMERCIAL

## 2022-04-02 ENCOUNTER — ANESTHESIA EVENT (OUTPATIENT)
Dept: LABOR AND DELIVERY | Age: 27
DRG: 560 | End: 2022-04-02
Payer: COMMERCIAL

## 2022-04-02 ENCOUNTER — HOSPITAL ENCOUNTER (INPATIENT)
Age: 27
LOS: 3 days | Discharge: HOME | DRG: 560 | End: 2022-04-05
Attending: OBSTETRICS & GYNECOLOGY | Admitting: OBSTETRICS & GYNECOLOGY
Payer: COMMERCIAL

## 2022-04-02 PROBLEM — Z33.1 IUP (INTRAUTERINE PREGNANCY), INCIDENTAL: Status: ACTIVE | Noted: 2022-01-07

## 2022-04-02 PROBLEM — O99.213 OBESITY AFFECTING PREGNANCY IN THIRD TRIMESTER: Status: ACTIVE | Noted: 2022-04-02

## 2022-04-02 PROBLEM — Z3A.38 38 WEEKS GESTATION OF PREGNANCY: Status: ACTIVE | Noted: 2022-04-02

## 2022-04-02 LAB
ABO + RH BLD: NORMAL
APPEARANCE UR: CLEAR
BASOPHILS # BLD: 0 K/UL (ref 0–0.1)
BASOPHILS NFR BLD: 0 % (ref 0–2)
BILIRUB UR QL: NEGATIVE
BLOOD GROUP ANTIBODIES SERPL: NORMAL
COLOR UR: YELLOW
DIFFERENTIAL METHOD BLD: ABNORMAL
EOSINOPHIL # BLD: 0.1 K/UL (ref 0–0.4)
EOSINOPHIL NFR BLD: 1 % (ref 0–5)
ERYTHROCYTE [DISTWIDTH] IN BLOOD BY AUTOMATED COUNT: 14.6 % (ref 11.6–14.5)
GLUCOSE UR QL STRIP.AUTO: NEGATIVE MG/DL
HCT VFR BLD AUTO: 36.3 % (ref 35–45)
HGB BLD-MCNC: 12 G/DL (ref 12–16)
IMM GRANULOCYTES # BLD AUTO: 0.1 K/UL (ref 0–0.04)
IMM GRANULOCYTES NFR BLD AUTO: 1 % (ref 0–0.5)
KETONES UR-MCNC: NEGATIVE MG/DL
LEUKOCYTE ESTERASE UR QL STRIP: ABNORMAL
LYMPHOCYTES # BLD: 2 K/UL (ref 0.9–3.6)
LYMPHOCYTES NFR BLD: 15 % (ref 21–52)
MCH RBC QN AUTO: 28.6 PG (ref 24–34)
MCHC RBC AUTO-ENTMCNC: 33.1 G/DL (ref 31–37)
MCV RBC AUTO: 86.6 FL (ref 78–100)
MONOCYTES # BLD: 0.7 K/UL (ref 0.05–1.2)
MONOCYTES NFR BLD: 5 % (ref 3–10)
NEUTS SEG # BLD: 10.6 K/UL (ref 1.8–8)
NEUTS SEG NFR BLD: 79 % (ref 40–73)
NITRITE UR QL: NEGATIVE
NRBC # BLD: 0 K/UL (ref 0–0.01)
NRBC BLD-RTO: 0 PER 100 WBC
PH UR: 6.5 [PH] (ref 5–9)
PLATELET # BLD AUTO: 289 K/UL (ref 135–420)
PMV BLD AUTO: 10.1 FL (ref 9.2–11.8)
PROT UR QL: NEGATIVE MG/DL
RBC # BLD AUTO: 4.19 M/UL (ref 4.2–5.3)
RBC # UR STRIP: NEGATIVE /UL
SERVICE CMNT-IMP: ABNORMAL
SP GR UR: 1.01 (ref 1–1.02)
SPECIMEN EXP DATE BLD: NORMAL
UROBILINOGEN UR QL: 0.2 EU/DL (ref 0.2–1)
WBC # BLD AUTO: 13.5 K/UL (ref 4.6–13.2)

## 2022-04-02 PROCEDURE — 74011250636 HC RX REV CODE- 250/636

## 2022-04-02 PROCEDURE — 74011250636 HC RX REV CODE- 250/636: Performed by: NURSE ANESTHETIST, CERTIFIED REGISTERED

## 2022-04-02 PROCEDURE — 77030007879 HC KT SPN EPDRL TELE -B: Performed by: ANESTHESIOLOGY

## 2022-04-02 PROCEDURE — 74011000258 HC RX REV CODE- 258

## 2022-04-02 PROCEDURE — 74011000258 HC RX REV CODE- 258: Performed by: ADVANCED PRACTICE MIDWIFE

## 2022-04-02 PROCEDURE — 86900 BLOOD TYPING SEROLOGIC ABO: CPT

## 2022-04-02 PROCEDURE — 36415 COLL VENOUS BLD VENIPUNCTURE: CPT

## 2022-04-02 PROCEDURE — 65270000029 HC RM PRIVATE

## 2022-04-02 PROCEDURE — 81003 URINALYSIS AUTO W/O SCOPE: CPT

## 2022-04-02 PROCEDURE — 74011250636 HC RX REV CODE- 250/636: Performed by: ADVANCED PRACTICE MIDWIFE

## 2022-04-02 PROCEDURE — 74011000250 HC RX REV CODE- 250: Performed by: NURSE ANESTHETIST, CERTIFIED REGISTERED

## 2022-04-02 PROCEDURE — 85025 COMPLETE CBC W/AUTO DIFF WBC: CPT

## 2022-04-02 RX ORDER — OXYTOCIN/0.9 % SODIUM CHLORIDE 30/500 ML
87.3 PLASTIC BAG, INJECTION (ML) INTRAVENOUS AS NEEDED
Status: DISCONTINUED | OUTPATIENT
Start: 2022-04-02 | End: 2022-04-03 | Stop reason: HOSPADM

## 2022-04-02 RX ORDER — LIDOCAINE HYDROCHLORIDE 10 MG/ML
20 INJECTION, SOLUTION EPIDURAL; INFILTRATION; INTRACAUDAL; PERINEURAL AS NEEDED
Status: DISCONTINUED | OUTPATIENT
Start: 2022-04-02 | End: 2022-04-03 | Stop reason: HOSPADM

## 2022-04-02 RX ORDER — ONDANSETRON 2 MG/ML
4 INJECTION INTRAMUSCULAR; INTRAVENOUS
Status: DISCONTINUED | OUTPATIENT
Start: 2022-04-02 | End: 2022-04-03 | Stop reason: HOSPADM

## 2022-04-02 RX ORDER — TERBUTALINE SULFATE 1 MG/ML
0.25 INJECTION SUBCUTANEOUS
Status: DISCONTINUED | OUTPATIENT
Start: 2022-04-02 | End: 2022-04-03 | Stop reason: HOSPADM

## 2022-04-02 RX ORDER — METHYLERGONOVINE MALEATE 0.2 MG/ML
0.2 INJECTION INTRAVENOUS AS NEEDED
Status: DISCONTINUED | OUTPATIENT
Start: 2022-04-02 | End: 2022-04-03 | Stop reason: HOSPADM

## 2022-04-02 RX ORDER — FENTANYL/ROPIVACAINE/NS/PF 2MCG/ML-.1
PLASTIC BAG, INJECTION (ML) EPIDURAL
Status: COMPLETED
Start: 2022-04-02 | End: 2022-04-02

## 2022-04-02 RX ORDER — SODIUM CHLORIDE 0.9 % (FLUSH) 0.9 %
5-40 SYRINGE (ML) INJECTION AS NEEDED
Status: DISCONTINUED | OUTPATIENT
Start: 2022-04-02 | End: 2022-04-03 | Stop reason: HOSPADM

## 2022-04-02 RX ORDER — OXYTOCIN/RINGER'S LACTATE 30/500 ML
10 PLASTIC BAG, INJECTION (ML) INTRAVENOUS AS NEEDED
Status: DISCONTINUED | OUTPATIENT
Start: 2022-04-02 | End: 2022-04-03 | Stop reason: HOSPADM

## 2022-04-02 RX ORDER — MINERAL OIL
30 OIL (ML) ORAL AS NEEDED
Status: DISCONTINUED | OUTPATIENT
Start: 2022-04-02 | End: 2022-04-03 | Stop reason: HOSPADM

## 2022-04-02 RX ORDER — ROPIVACAINE IN 0.9% SOD CHL/PF 0.2 %
PLASTIC BAG, INJECTION (ML) EPIDURAL AS NEEDED
Status: DISCONTINUED | OUTPATIENT
Start: 2022-04-02 | End: 2022-04-02 | Stop reason: HOSPADM

## 2022-04-02 RX ORDER — PHENYLEPHRINE HCL IN 0.9% NACL 1 MG/10 ML
80 SYRINGE (ML) INTRAVENOUS AS NEEDED
Status: COMPLETED | OUTPATIENT
Start: 2022-04-02 | End: 2022-04-02

## 2022-04-02 RX ORDER — HYDROMORPHONE HYDROCHLORIDE 1 MG/ML
1 INJECTION, SOLUTION INTRAMUSCULAR; INTRAVENOUS; SUBCUTANEOUS
Status: DISCONTINUED | OUTPATIENT
Start: 2022-04-02 | End: 2022-04-03 | Stop reason: HOSPADM

## 2022-04-02 RX ORDER — NALBUPHINE HYDROCHLORIDE 10 MG/ML
10 INJECTION, SOLUTION INTRAMUSCULAR; INTRAVENOUS; SUBCUTANEOUS
Status: DISCONTINUED | OUTPATIENT
Start: 2022-04-02 | End: 2022-04-03 | Stop reason: HOSPADM

## 2022-04-02 RX ORDER — SODIUM CHLORIDE, SODIUM LACTATE, POTASSIUM CHLORIDE, CALCIUM CHLORIDE 600; 310; 30; 20 MG/100ML; MG/100ML; MG/100ML; MG/100ML
125 INJECTION, SOLUTION INTRAVENOUS CONTINUOUS
Status: DISCONTINUED | OUTPATIENT
Start: 2022-04-02 | End: 2022-04-03 | Stop reason: HOSPADM

## 2022-04-02 RX ORDER — LIDOCAINE HYDROCHLORIDE 10 MG/ML
INJECTION INFILTRATION; PERINEURAL AS NEEDED
Status: DISCONTINUED | OUTPATIENT
Start: 2022-04-02 | End: 2022-04-02 | Stop reason: HOSPADM

## 2022-04-02 RX ORDER — FENTANYL/ROPIVACAINE/NS/PF 2MCG/ML-.1
1-15 PLASTIC BAG, INJECTION (ML) EPIDURAL
Status: DISCONTINUED | OUTPATIENT
Start: 2022-04-02 | End: 2022-04-03 | Stop reason: HOSPADM

## 2022-04-02 RX ORDER — DIPHENHYDRAMINE HYDROCHLORIDE 50 MG/ML
25 INJECTION, SOLUTION INTRAMUSCULAR; INTRAVENOUS
Status: DISCONTINUED | OUTPATIENT
Start: 2022-04-02 | End: 2022-04-03 | Stop reason: HOSPADM

## 2022-04-02 RX ORDER — SODIUM CHLORIDE 0.9 % (FLUSH) 0.9 %
5-40 SYRINGE (ML) INJECTION EVERY 8 HOURS
Status: DISCONTINUED | OUTPATIENT
Start: 2022-04-02 | End: 2022-04-03 | Stop reason: HOSPADM

## 2022-04-02 RX ORDER — METOCLOPRAMIDE HYDROCHLORIDE 5 MG/ML
10 INJECTION INTRAMUSCULAR; INTRAVENOUS
Status: DISCONTINUED | OUTPATIENT
Start: 2022-04-02 | End: 2022-04-03 | Stop reason: HOSPADM

## 2022-04-02 RX ORDER — LIDOCAINE HYDROCHLORIDE AND EPINEPHRINE 15; 5 MG/ML; UG/ML
INJECTION, SOLUTION EPIDURAL AS NEEDED
Status: DISCONTINUED | OUTPATIENT
Start: 2022-04-02 | End: 2022-04-02 | Stop reason: HOSPADM

## 2022-04-02 RX ORDER — EPHEDRINE SULFATE/0.9% NACL/PF 50 MG/5 ML
10 SYRINGE (ML) INTRAVENOUS AS NEEDED
Status: DISCONTINUED | OUTPATIENT
Start: 2022-04-02 | End: 2022-04-03 | Stop reason: HOSPADM

## 2022-04-02 RX ORDER — BUTORPHANOL TARTRATE 2 MG/ML
2 INJECTION INTRAMUSCULAR; INTRAVENOUS
Status: DISCONTINUED | OUTPATIENT
Start: 2022-04-02 | End: 2022-04-03 | Stop reason: HOSPADM

## 2022-04-02 RX ORDER — NALOXONE HYDROCHLORIDE 0.4 MG/ML
0.2 INJECTION, SOLUTION INTRAMUSCULAR; INTRAVENOUS; SUBCUTANEOUS AS NEEDED
Status: DISCONTINUED | OUTPATIENT
Start: 2022-04-02 | End: 2022-04-03 | Stop reason: HOSPADM

## 2022-04-02 RX ADMIN — Medication 80 MCG: at 21:24

## 2022-04-02 RX ADMIN — LIDOCAINE HYDROCHLORIDE,EPINEPHRINE BITARTRATE 4.5 ML: 15; .005 INJECTION, SOLUTION EPIDURAL; INFILTRATION; INTRACAUDAL; PERINEURAL at 20:18

## 2022-04-02 RX ADMIN — SODIUM CHLORIDE, POTASSIUM CHLORIDE, SODIUM LACTATE AND CALCIUM CHLORIDE 1000 ML: 600; 310; 30; 20 INJECTION, SOLUTION INTRAVENOUS at 19:45

## 2022-04-02 RX ADMIN — ROPIVACAINE HYDROCHLORIDE 12 ML/HR: 5 INJECTION, SOLUTION EPIDURAL; INFILTRATION; PERINEURAL at 20:26

## 2022-04-02 RX ADMIN — Medication 5 ML: at 20:24

## 2022-04-02 RX ADMIN — SODIUM CHLORIDE, POTASSIUM CHLORIDE, SODIUM LACTATE AND CALCIUM CHLORIDE 125 ML/HR: 600; 310; 30; 20 INJECTION, SOLUTION INTRAVENOUS at 20:29

## 2022-04-02 RX ADMIN — SODIUM CHLORIDE 5 MILLION UNITS: 900 INJECTION INTRAVENOUS at 18:50

## 2022-04-02 RX ADMIN — SODIUM CHLORIDE, POTASSIUM CHLORIDE, SODIUM LACTATE AND CALCIUM CHLORIDE 1000 ML: 600; 310; 30; 20 INJECTION, SOLUTION INTRAVENOUS at 18:05

## 2022-04-02 RX ADMIN — LIDOCAINE HYDROCHLORIDE 2 ML: 10 INJECTION, SOLUTION INFILTRATION; PERINEURAL at 20:12

## 2022-04-02 RX ADMIN — ROPIVACAINE HYDROCHLORIDE 12 ML/HR: 5 INJECTION EPIDURAL; INFILTRATION; PERINEURAL at 20:26

## 2022-04-02 RX ADMIN — SODIUM CHLORIDE 2.5 MILLION UNITS: 9 INJECTION, SOLUTION INTRAVENOUS at 23:54

## 2022-04-02 NOTE — H&P
Obstetrical History and Physical    Subjective:     Date of Admission: 2022    Patient is a 23 y.o.   female admitted with pregnancy at 38+6 for active labor. Her pregnancy has been complicated by obesity and marijuana use. She is GBS positive. For Obstetric history, see dee dee.    For Review of Systems, see prenatal    Past Medical History:   Diagnosis Date    Abnormal Papanicolaou smear of cervix     , 2019    Interstitial cystitis     Interstitial cystitis     Nicotine vapor product user       Past Surgical History:   Procedure Laterality Date    HX REFRACTIVE SURGERY        Prior to Admission medications    Medication Sig Start Date End Date Taking? Authorizing Provider   prenatal multivit-ca-min-fe-fa (Prenatal Vitamin) tab Take 1 Tablet by mouth daily. 21   CRISTINO Moore   levonorgestreL (Mirena) 20 mcg/24 hours (5 yrs) 52 mg IUD 1 Device by IntraUTERine route once. Patient not taking: Reported on 2022    Other, MD James     No Known Allergies   Social History     Tobacco Use    Smoking status: Never Smoker    Smokeless tobacco: Never Used   Substance Use Topics    Alcohol use: Not Currently     Comment: social      No family history on file. Objective:     Blood pressure (!) 131/59, pulse 74, temperature 98.5 °F (36.9 °C), resp. rate 22, height 5' 2\" (1.575 m), weight 103 kg (227 lb), last menstrual period 2021. Temp (24hrs), Av.5 °F (36.9 °C), Min:98.5 °F (36.9 °C), Max:98.5 °F (36.9 °C)    No intake/output data recorded. No intake/output data recorded. HEENT: No pallor, no jaundice, Thyroid and throat normal  RESPIRATORY: Clear to A & P  CVS: pulse reg, HS normal  ABDOMEN: Gravid. Vertex. EFW=8lb +-1lb. No abnormal tenderness.    Pelvic: Cervix 6,   Effaced: 90%  Station:  -2     Data Review:   Recent Results (from the past 24 hour(s))   POC URINE MACROSCOPIC    Collection Time: 22  6:09 PM   Result Value Ref Range    Color YELLOW Appearance CLEAR      Spec. gravity (POC) 1.010 1.001 - 1.023      pH, urine  (POC) 6.5 5.0 - 9.0      Protein (POC) Negative NEG mg/dL    Glucose, urine (POC) Negative NEG mg/dL    Ketones (POC) Negative NEG mg/dL    Bilirubin (POC) Negative NEG      Blood (POC) Negative NEG      Urobilinogen (POC) 0.2 0.2 - 1.0 EU/dL    Nitrite (POC) Negative NEG      Leukocyte esterase (POC) TRACE (A) NEG      Performed by [facility] Eryn      Monitor:  Baseline 155, moderate variability, + accelerations, early decelerations. Ctx q 3-7 minutes lasting 60-80 seconds. Cat I. Assessment:     Principal Problem:    IUP (intrauterine pregnancy), incidental (2022)    Active Problems:    Obesity affecting pregnancy in third trimester (2022)      38 weeks gestation of pregnancy (2022)      Plan:   Admit to L&D  PCN for GBS prophylaxis  Anticipate     Check labs:  CBC, T&S    Type of admit:In-Patient    I saw and examined patient. Dr. Ray Whittington aware of admission and plan of care.     Signed By: Ovidio Chinchilla CNM                         2022

## 2022-04-02 NOTE — PROGRESS NOTES
1730 , 38 6/7 weeks pregnant here for contractions since 040.    1744 ALFONSO Kevin CNM given report re: g/p, edc, ve, complaints of contractions, gbs positive. Orders for IVF and will recheck cervix. [address] in room for vaginal exam. 6cm, Orders for admit.  Bedside and Verbal shift change report given to [facility] Ann-Marie Fitzgerald (oncoming nurse) by Ernie Wagner RN  (offgoing nurse). Report included the following information SBAR, Kardex, Intake/Output, MAR, Recent Results and Med Rec Status.

## 2022-04-02 NOTE — ROUTINE PROCESS
1915 Bedside and Verbal shift change report received from PANDA Anderson RN (offgoing nurse) to SHANNON Strong (oncoming nurse). Report included the following information SBAR, Kardex, MAR and Recent Results. 1930 Pt is uncomfortable and rating pain 8/10. Labs not back for epidural, but offered birthing ball. Pt reports some relief and better coping while on the ball. 2025 Epidural in place. Pt resting comfortably. 2038 Fluid bolus given to increase BP per CNM SYLVIA Rai. Pt repositioned. Will continue to monitor. 2109 SYLVIA Hi at bedside. FSE applied for better FHT tracing. 2122 Sarah Villareal CRNA to consult. Advised to medicate for decreased BP. See MAR. Will continue to monitor. 2140 Pt resting comfortably. Denies nausea, is not diaphoretic, is a&ox4. Call bell within reach. Pt instructed to call if feeling symptoms of low BP.     2209 IUPC inserted by SYLVIA Flowers Amnioinfusion started at provider request due to recurrent variables. Pt repositioned to high fowlers/throne position. Additional comfort measures provided, pillow, blanket     0715 Bedside and Verbal shift change report given to MEDINA Abreu (oncoming nurse) by SHANNON Stephens (offgoing nurse). Report included the following information SBAR, Kardex, MAR and Recent Results.

## 2022-04-03 PROBLEM — E66.9 OBESITY: Status: ACTIVE | Noted: 2022-04-03

## 2022-04-03 PROCEDURE — 74011250636 HC RX REV CODE- 250/636: Performed by: ADVANCED PRACTICE MIDWIFE

## 2022-04-03 PROCEDURE — 75410000002 HC LABOR FEE PER 1 HR

## 2022-04-03 PROCEDURE — 74011000258 HC RX REV CODE- 258: Performed by: ADVANCED PRACTICE MIDWIFE

## 2022-04-03 PROCEDURE — 74011000258 HC RX REV CODE- 258: Performed by: NURSE ANESTHETIST, CERTIFIED REGISTERED

## 2022-04-03 PROCEDURE — 75410000003 HC RECOV DEL/VAG/CSECN EA 0.5 HR

## 2022-04-03 PROCEDURE — 74011000258 HC RX REV CODE- 258: Performed by: MIDWIFE

## 2022-04-03 PROCEDURE — 74011250636 HC RX REV CODE- 250/636: Performed by: MIDWIFE

## 2022-04-03 PROCEDURE — 10907ZC DRAINAGE OF AMNIOTIC FLUID, THERAPEUTIC FROM PRODUCTS OF CONCEPTION, VIA NATURAL OR ARTIFICIAL OPENING: ICD-10-PCS | Performed by: OBSTETRICS & GYNECOLOGY

## 2022-04-03 PROCEDURE — 0UQMXZZ REPAIR VULVA, EXTERNAL APPROACH: ICD-10-PCS | Performed by: OBSTETRICS & GYNECOLOGY

## 2022-04-03 PROCEDURE — 65270000029 HC RM PRIVATE

## 2022-04-03 PROCEDURE — 75410000000 HC DELIVERY VAGINAL/SINGLE

## 2022-04-03 PROCEDURE — 10H07YZ INSERTION OF OTHER DEVICE INTO PRODUCTS OF CONCEPTION, VIA NATURAL OR ARTIFICIAL OPENING: ICD-10-PCS | Performed by: OBSTETRICS & GYNECOLOGY

## 2022-04-03 PROCEDURE — 74011250637 HC RX REV CODE- 250/637: Performed by: MIDWIFE

## 2022-04-03 PROCEDURE — 88307 TISSUE EXAM BY PATHOLOGIST: CPT

## 2022-04-03 PROCEDURE — 74011250636 HC RX REV CODE- 250/636: Performed by: OBSTETRICS & GYNECOLOGY

## 2022-04-03 PROCEDURE — 77030040830 HC CATH URETH FOL MDII -A

## 2022-04-03 PROCEDURE — 76060000078 HC EPIDURAL ANESTHESIA

## 2022-04-03 PROCEDURE — 74011000258 HC RX REV CODE- 258: Performed by: OBSTETRICS & GYNECOLOGY

## 2022-04-03 PROCEDURE — 00HU33Z INSERTION OF INFUSION DEVICE INTO SPINAL CANAL, PERCUTANEOUS APPROACH: ICD-10-PCS | Performed by: OBSTETRICS & GYNECOLOGY

## 2022-04-03 PROCEDURE — 74011250636 HC RX REV CODE- 250/636: Performed by: NURSE ANESTHETIST, CERTIFIED REGISTERED

## 2022-04-03 RX ORDER — AMPICILLIN 2 G/1
2 INJECTION, POWDER, FOR SOLUTION INTRAVENOUS EVERY 6 HOURS
Status: DISCONTINUED | OUTPATIENT
Start: 2022-04-03 | End: 2022-04-03 | Stop reason: SDUPTHER

## 2022-04-03 RX ORDER — SODIUM CHLORIDE 9 MG/ML
50 INJECTION, SOLUTION INTRAVENOUS CONTINUOUS
Status: DISCONTINUED | OUTPATIENT
Start: 2022-04-03 | End: 2022-04-05 | Stop reason: HOSPADM

## 2022-04-03 RX ORDER — AMOXICILLIN 250 MG
1 CAPSULE ORAL
Status: DISCONTINUED | OUTPATIENT
Start: 2022-04-03 | End: 2022-04-05 | Stop reason: HOSPADM

## 2022-04-03 RX ORDER — LANOLIN ALCOHOL/MO/W.PET/CERES
3 CREAM (GRAM) TOPICAL
Status: DISCONTINUED | OUTPATIENT
Start: 2022-04-03 | End: 2022-04-05 | Stop reason: HOSPADM

## 2022-04-03 RX ORDER — AMPICILLIN 2 G/1
INJECTION, POWDER, FOR SOLUTION INTRAVENOUS
Status: DISPENSED
Start: 2022-04-03 | End: 2022-04-03

## 2022-04-03 RX ORDER — AMOXICILLIN 250 MG/1
500 CAPSULE ORAL EVERY 8 HOURS
Status: COMPLETED | OUTPATIENT
Start: 2022-04-04 | End: 2022-04-04

## 2022-04-03 RX ORDER — IBUPROFEN 400 MG/1
800 TABLET ORAL
Status: DISCONTINUED | OUTPATIENT
Start: 2022-04-03 | End: 2022-04-05 | Stop reason: HOSPADM

## 2022-04-03 RX ORDER — ACETAMINOPHEN 325 MG/1
650 TABLET ORAL
Status: DISCONTINUED | OUTPATIENT
Start: 2022-04-03 | End: 2022-04-05 | Stop reason: HOSPADM

## 2022-04-03 RX ORDER — CLINDAMYCIN HYDROCHLORIDE 150 MG/1
300 CAPSULE ORAL EVERY 6 HOURS
Status: COMPLETED | OUTPATIENT
Start: 2022-04-04 | End: 2022-04-04

## 2022-04-03 RX ADMIN — AMPICILLIN SODIUM 2 G: 2 INJECTION, POWDER, FOR SOLUTION INTRAMUSCULAR; INTRAVENOUS at 09:59

## 2022-04-03 RX ADMIN — SODIUM CHLORIDE 2.5 MILLION UNITS: 9 INJECTION, SOLUTION INTRAVENOUS at 05:03

## 2022-04-03 RX ADMIN — IBUPROFEN 800 MG: 400 TABLET ORAL at 23:03

## 2022-04-03 RX ADMIN — AMPICILLIN SODIUM 2 G: 2 INJECTION, POWDER, FOR SOLUTION INTRAMUSCULAR; INTRAVENOUS at 16:09

## 2022-04-03 RX ADMIN — ROPIVACAINE HYDROCHLORIDE 10 ML/HR: 5 INJECTION EPIDURAL; INFILTRATION; PERINEURAL at 05:04

## 2022-04-03 RX ADMIN — IBUPROFEN 800 MG: 400 TABLET ORAL at 13:46

## 2022-04-03 RX ADMIN — AMPICILLIN SODIUM 2 G: 2 INJECTION, POWDER, FOR SOLUTION INTRAMUSCULAR; INTRAVENOUS at 22:25

## 2022-04-03 RX ADMIN — GENTAMICIN SULFATE 350 MG: 40 INJECTION, SOLUTION INTRAMUSCULAR; INTRAVENOUS at 11:19

## 2022-04-03 NOTE — ROUTINE PROCESS
Pt up to BR, ambulated well and voided large amount without difficulty. Pericare instructed and performed. Gown changed, new peripad and icepack applied with mesh underpants. Pt transferred to [facility] via w/c and report given.

## 2022-04-03 NOTE — PROGRESS NOTES
OB Labor Note    Assessment:     G1 at 39+0 wga, IUP in active labor              Comfortable with epidural              CAT II fetal tracing - overall reassuring   Moderate risk PPH     Plan:    IUPC placed at 2210 - has needed amnioinfusion for recurrent variable decelerations now resolved              Continue to monitor labor              Anticipate               fetal resuscitative measures as indicated                   Subjective:              Pt. does not have any complaints. Pt. is not feeling contractions. Pt. is feeling fetal movement.       Objective:     V/E: 0105 9/100/0     Ctx 2-3 min.     CAT II fetal tracing - baseline rate 150, no accelerations, intermittent late decelerations, moderate variability    Visit Vitals  /62   Pulse 67   Temp 97.8 °F (36.6 °C)   Resp 18   Ht 5' 2\" (1.575 m)   Wt 103 kg (227 lb)   SpO2 97%   BMI 41.52 kg/m²      Cervical Exam  Dilation (cm): 7  Eff: 90 %  Station: -1  Vaginal exam done by? : Tony Martines Status: AROM     Patient Vitals for the past 4 hrs:    Mode Fetal Heart Rate Variability Decelerations Accelerations RN Reviewed Strip?   22 External 150 6-25 BPM Variable Yes Yes   22 -- -- -- -- -- Yes          4/3/2022 1:10 AM

## 2022-04-03 NOTE — ROUTINE PROCESS
2004. Anesthesia at bedside    2008: In position for epidural    2009: Epidural time out    2017: Epidural catheter placed    2018 Epidural test dose    2024: Epidural loading dose admin    2025:  To R tilt

## 2022-04-03 NOTE — PROGRESS NOTES
OB Labor Note    Assessment:   G1 at 38+6 wga, IUP in active labor   Comfortable with recent epidural   FHR tracing overall reassuring     Plan: Readjust contraction monitor to better assess fetal status or place IUPC   Continue to monitor labor   Anticipate    Expectant Management       Subjective:   Pt. does not have any complaints. Pt. is not feeling contractions. Pt. is feeling fetal movement. Objective:    V/E: see below,  AROMc, FSE applied    Ctx 2-3 min. Unable to assess for decelerations due to ctx monitor - baseline rate 145, accelerations, moderate variability         Visit Vitals  BP (!) 131/59   Pulse 68   Temp 98.6 °F (37 °C)   Resp 18   Ht 5' 2\" (1.575 m)   Wt 103 kg (227 lb)   BMI 41.52 kg/m²      Cervical Exam  Dilation (cm): 7  Eff: 90 %  Station: -1  Vaginal exam done by? : Gemini Beal Status: AROM     Patient Vitals for the past 4 hrs:    Mode Fetal Heart Rate Variability Decelerations Accelerations   22 1900 External 145 6-25 BPM None Yes   22 1830 External 140 6-25 BPM None Yes   22 1800 External 150 6-25 BPM None Yes          2022 9:21 PM

## 2022-04-03 NOTE — PROGRESS NOTES
1325- TRANSFER - IN REPORT:    Verbal report received from Gale(name) on Ronnie Moseley Mean  being received from L/D(unit) for routine progression of care      Report consisted of patients Situation, Background, Assessment and   Recommendations(SBAR). Information from the following report(s) SBAR, Intake/Output, MAR and Recent Results was reviewed with the receiving nurse. Opportunity for questions and clarification was provided. Assessment completed upon patients arrival to unit and care assumed. 1346- pt ambulated to restroom and voided- missed hat- pt medicated per MAR. Set mom up with double electric breast pump and educated on how to use initiation mode, pump hygiene, and safe milk storage due to infant. Mom to pump q 3 hours for 15 minutes on initiation mode. Mom verbalized understanding and no questions at this time. 1520- pt ambulating back from NICU    1609- pt medicated per MAR    1800- pt ambulating to NICU    1905- Bedside and Verbal shift change report given to Doreen Emerson (oncoming nurse) by Erick Stiles (offgoing nurse). Report included the following information SBAR, Intake/Output, MAR and Recent Results.

## 2022-04-03 NOTE — ANESTHESIA PREPROCEDURE EVALUATION
Relevant Problems   ENDOCRINE   (+) Obesity affecting pregnancy in third trimester       Anesthetic History   No history of anesthetic complications            Review of Systems / Medical History  Patient summary reviewed, nursing notes reviewed and pertinent labs reviewed    Pulmonary  Within defined limits                 Neuro/Psych   Within defined limits           Cardiovascular  Within defined limits                Exercise tolerance: >4 METS     GI/Hepatic/Renal  Within defined limits              Endo/Other        Morbid obesity     Other Findings   Comments: Interstitial cystitis           Physical Exam    Airway  Mallampati: II  TM Distance: < 4 cm  Neck ROM: normal range of motion   Mouth opening: Normal     Cardiovascular  Regular rate and rhythm,  S1 and S2 normal,  no murmur, click, rub, or gallop             Dental  No notable dental hx       Pulmonary                 Abdominal  GI exam deferred       Other Findings            Anesthetic Plan    ASA: 1  Anesthesia type: epidural            Anesthetic plan and risks discussed with: Patient      Risks reviewed with pt, agrees to proceed.

## 2022-04-03 NOTE — PROGRESS NOTES
OB Labor Note    Assessment:      G1 at 39+0 wga, IUP in active labor              Comfortable with epidural              CAT II fetal tracing - overall reassuring   Moderate risk PPH     Plan:    IUPC placed at 2210 - has needed amnioinfusion for recurrent variable decelerations now resolved              Continue to monitor labor              Anticipate               fetal resuscitative measures as indicated                   Subjective:              Pt. does not have any complaints. Pt. is not feeling contractions. Pt. is feeling fetal movement.       Objective:     V/E: see below,  AROMc, FSE applied     Ctx 2-3 min.     CAT II fetal tracing - baseline rate 150, accelerations, intermittent late decelerations, moderate variability      Visit Vitals  /62   Pulse 67   Temp 97.8 °F (36.6 °C)   Resp 18   Ht 5' 2\" (1.575 m)   Wt 103 kg (227 lb)   SpO2 97%   BMI 41.52 kg/m²      Cervical Exam  Dilation (cm): 7  Eff: 90 %  Station: -1  Vaginal exam done by? : Kvng Mata Status: AROM   No data found.        4/3/2022 12:19 AM

## 2022-04-03 NOTE — ANESTHESIA PROCEDURE NOTES
Epidural Block    Patient location during procedure: OB  Start time: 4/2/2022 8:12 PM  End time: 4/2/2022 8:17 PM  Reason for block: labor epidural  Staffing  Performed: CRNA   Anesthesiologist: Meg Olvera MD  Resident/CRNA: Bard Rosemary AVILA CRNA  Preanesthetic Checklist  Completed: patient identified, IV checked, site marked, risks and benefits discussed, surgical consent, monitors and equipment checked, pre-op evaluation and timeout performed  Block Placement  Patient position: sitting  Prep: ChloraPrep  Sterility prep: mask, hand, gloves, drape and cap  Sedation level: no sedation  Patient monitoring: heart rate, frequent blood pressure checks and continuous pulse oximetry  Approach: midline  Location: lumbar  Lumbar location: L3-L4  Epidural  Loss of resistance technique: saline  Guidance: landmark technique  Needle  Needle type: Tuohy   Needle gauge: 17 G  Needle length: 9 cm  Needle insertion depth: 7 cm  Catheter type: end hole  Catheter size: 19 G  Catheter at skin depth: 12 cm  Catheter securement method: clear occlusive dressing, stabilization device and surgical tape  Test dose: negative  Assessment  Sensory level: T6  Block outcome: pain improved  Number of attempts: 1  Procedure assessment: patient tolerated procedure well with no immediate complications

## 2022-04-03 NOTE — L&D DELIVERY NOTE
Delivery Summary    Patient: Elmira Valdez MRN: [id number]  SSN: [id number]    YOB: 1995  Age: 23 y.o. Sex: female       Information for the patient's :  Albertina Whittington [[id number]]       Labor Events:    Labor: No    Steroids: None   Cervical Ripening Date/Time:       Cervical Ripening Type: None   Antibiotics During Labor:     Rupture Identifier:      Rupture Date/Time: 2022 9:08 PM   Rupture Type: AROM   Amniotic Fluid Volume:      Amniotic Fluid Description: Clear    Amniotic Fluid Odor: None    Induction:         Induction Date/Time:        Indications for Induction:      Augmentation:     Augmentation Date/Time:      Indications for Augmentation:     Labor complications: Chorioamnionitis       Additional complications:        Delivery Events:  Indications For Episiotomy:     Episiotomy: None   Perineal Laceration(s): None   Repaired:     Periurethral Laceration Location:      Repaired:     Labial Laceration Location: bilateral   Repaired: Yes   Sulcal Laceration Location:     Repaired:     Vaginal Laceration Location: left   Repaired:     Cervical Laceration Location:     Repaired:     Repair Suture: Vicryl 3-0   Number of Repair Packets: 1   Estimated Blood Loss (ml): 150ml   Quantitative Blood Loss (ml)                Delivery Date: 4/3/2022    Delivery Time: 8:56 AM  Delivery Type: Vaginal, Spontaneous  Sex:  Female    Gestational Age: 39w0d   Delivery Clinician:  Marixa Ahmadi  Living Status:     Delivery Location:              [facility]  One minute Five minutes Ten minutes   Skin color:            Heart rate:            Grimace:            Muscle tone:            Breathing: Totals:                Presentation: Vertex    Position: Middle Occiput Anterior  Resuscitation Method:        Meconium Stained:        Cord Information: 3 Vessels  Complications: None  Cord around:    Delayed cord clamping?  No  Cord clamped date/time: Disposition of Cord Blood:      Blood Gases Sent?: No    Placenta:  Date/Time:    Removal: Spontaneous      Appearance: Normal      Measurements:  Birth Weight:        Birth Length:        Head Circumference:        Chest Circumference:       Abdominal Girth: Other Providers:   ;;;;;;;;Brenda Nunez, Obstetrician;Primary Nurse;Primary  Nurse;Nicu Nurse;Neonatologist;Anesthesiologist;Crna;Nurse Practitioner;Midwife;Nursery Nurse           Group B Strep:   Lab Results   Component Value Date/Time    GrBStrep, External positive 2022 12:00 AM     Information for the patient's :  Syed Santos, [facility] [[id number]]   No results found for: ABORH, PCTABR, PCTDIG, BILI, ABORHEXT, ABORH     No results for input(s): PCO2CB, PO2CB, HCO3I, SO2I, IBD, PTEMPI, SPECTI, PHICB, ISITE, IDEV, IALLEN in the last 72 hours. Presented to room to begin pushing after 2 hours of laboring down. With great maternal pushing effort fetal head descended to . Patient began complaining of exhaustion and pushing effort decreased. NNP requested for risk of shoulder dystocia. With controlled pushing fetal head delivered OA restituted to LOT, with next maternal push anterior shoulder and body delivered shortly. Poor tone and response noted from infant. Immediate cord clamping x2, and cord cut by FOB. Infant taken to warmer for assessment by NNP. Thick meconium fluid noted with delivery. Immediately at delivery maternal fever of 101.7 noted with fetal tachycardia prior to delivery, will treat for suspected III with amp and gent, NNP aware for appropriate treatment of . Pitocin started with anterior shoulder for active third stage management. Placenta delivered spontaneously, busch, 3VC, apparently intact on exam, will send to pathology for suspected III. Perineum inspected, right labial laceration, hemostatic without repair.  Left vaginal laceration with extension to left labial repaired with good hemostasis and cosmesis. Fundus firm at 2 below umbilicus. Bleeding small. Counts correct x2. Mom in stable condition, baby to NICU for further care.

## 2022-04-04 LAB
ANION GAP SERPL CALC-SCNC: 7 MMOL/L (ref 3–18)
BUN SERPL-MCNC: 15 MG/DL (ref 7–18)
BUN/CREAT SERPL: 14 (ref 12–20)
CALCIUM SERPL-MCNC: 8.1 MG/DL (ref 8.5–10.1)
CHLORIDE SERPL-SCNC: 111 MMOL/L (ref 100–111)
CO2 SERPL-SCNC: 24 MMOL/L (ref 21–32)
CREAT SERPL-MCNC: 1.04 MG/DL (ref 0.6–1.3)
GLUCOSE SERPL-MCNC: 95 MG/DL (ref 74–99)
HCT VFR BLD AUTO: 30.7 % (ref 35–45)
HGB BLD-MCNC: 9.8 G/DL (ref 12–16)
POTASSIUM SERPL-SCNC: 4.3 MMOL/L (ref 3.5–5.5)
SODIUM SERPL-SCNC: 142 MMOL/L (ref 136–145)

## 2022-04-04 PROCEDURE — 65270000029 HC RM PRIVATE

## 2022-04-04 PROCEDURE — 36415 COLL VENOUS BLD VENIPUNCTURE: CPT

## 2022-04-04 PROCEDURE — 74011250637 HC RX REV CODE- 250/637: Performed by: MIDWIFE

## 2022-04-04 PROCEDURE — 85018 HEMOGLOBIN: CPT

## 2022-04-04 PROCEDURE — 80048 BASIC METABOLIC PNL TOTAL CA: CPT

## 2022-04-04 RX ADMIN — AMOXICILLIN 500 MG: 250 CAPSULE ORAL at 13:49

## 2022-04-04 RX ADMIN — CLINDAMYCIN HYDROCHLORIDE 300 MG: 150 CAPSULE ORAL at 06:16

## 2022-04-04 RX ADMIN — CLINDAMYCIN HYDROCHLORIDE 300 MG: 150 CAPSULE ORAL at 13:49

## 2022-04-04 RX ADMIN — IBUPROFEN 800 MG: 400 TABLET ORAL at 17:35

## 2022-04-04 RX ADMIN — CLINDAMYCIN HYDROCHLORIDE 300 MG: 150 CAPSULE ORAL at 20:16

## 2022-04-04 RX ADMIN — AMOXICILLIN 500 MG: 250 CAPSULE ORAL at 06:16

## 2022-04-04 RX ADMIN — CLINDAMYCIN HYDROCHLORIDE 300 MG: 150 CAPSULE ORAL at 01:15

## 2022-04-04 RX ADMIN — ACETAMINOPHEN 650 MG: 325 TABLET ORAL at 20:26

## 2022-04-04 RX ADMIN — IBUPROFEN 800 MG: 400 TABLET ORAL at 08:05

## 2022-04-04 RX ADMIN — ACETAMINOPHEN 650 MG: 325 TABLET ORAL at 14:00

## 2022-04-04 NOTE — PROGRESS NOTES
1905 Bedside and Verbal shift change report given to ALFONSO Tran RN and MICHAEL BETHEA (oncoming nurse) by Gregg Olszewski (offgoing nurse). Report included the following information SBAR, Intake/Output, MAR and Recent Results.     1920 Assessment complete. See flow sheet. Educated patient on controls of breast pump. 2030 Patient given syringes for pumped breast milk. Patient going to go see infant in NICU.     2303 Midwife called to see if patient could be switched to oral antibiotics due to her IV getting infiltrated towards the end of her ampicillin dose that was due at 2200. Midwife aware that patient has been afebrile without taking any medications that may reduce a fever since 1300. Midwife said it is okay to switch patient to oral antibiotics.     0115 Patient given scheduled antibiotic.    0530 Verbal shift change report given to ALFONSO Tran RN by Benjamin BETHEA (offgoing nurse).  Report given with SBAR, Kardex, Intake/Output, MAR and Recent Results.

## 2022-04-04 NOTE — PROGRESS NOTES
0720 Bedside and Verbal shift change report given to MEDINA Frye RN (oncoming nurse) by Renetta Fung. Gaby Patel RN (offgoing nurse). Report included the following information SBAR, Kardex, Intake/Output, MAR and Recent Results. 9461 Assessment completed at this time. Motrin administered at this time. 1200 Pt in NICU at this time. 1349 Amoxicillin and clindamycin administered at this time. 1400 Tylenol administered at this time. Pt denies further needs at this time. 1735 Reassessment completed at this time. Motrin administered at this time. Pt denies further needs at this time. 1915 Bedside and Verbal shift change report given to Holden Simmons RN (oncoming nurse) by Nishant Gee RN (offgoing nurse). Report included the following information SBAR, Kardex, Intake/Output, MAR and Recent Results.

## 2022-04-04 NOTE — PROGRESS NOTES
Problem: Vaginal Delivery: Postpartum Day 1  Goal: Off Pathway (Use only if patient is Off Pathway)  Outcome: Progressing Towards Goal  Goal: Activity/Safety  Outcome: Progressing Towards Goal  Goal: Consults, if ordered  Outcome: Progressing Towards Goal  Goal: Diagnostic Test/Procedures  Outcome: Progressing Towards Goal  Goal: Nutrition/Diet  Outcome: Progressing Towards Goal  Goal: Discharge Planning  Outcome: Progressing Towards Goal  Goal: Medications  Outcome: Progressing Towards Goal  Goal: Treatments/Interventions/Procedures  Outcome: Progressing Towards Goal  Goal: Psychosocial  Outcome: Progressing Towards Goal  Goal: *Vital signs within defined limits  Outcome: Progressing Towards Goal  Goal: *Labs within defined limits  Outcome: Progressing Towards Goal  Goal: *Hemodynamically stable  Outcome: Progressing Towards Goal  Goal: *Optimal pain control at patient's stated goal  Outcome: Progressing Towards Goal  Goal: *Participates in infant care  Outcome: Progressing Towards Goal  Goal: *Demonstrates progressive activity  Outcome: Progressing Towards Goal  Goal: *Performs self perineal care  Outcome: Progressing Towards Goal  Goal: *Appropriate parent-infant bonding  Outcome: Progressing Towards Goal  Goal: *Tolerating diet  Outcome: Progressing Towards Goal  Goal: *Performs self breast care  Outcome: Progressing Towards Goal     Problem: Vaginal Delivery: Postpartum 2  Goal: Off Pathway (Use only if patient is Off Pathway)  Outcome: Progressing Towards Goal  Goal: Activity/Safety  Outcome: Progressing Towards Goal  Goal: Consults, if ordered  Outcome: Progressing Towards Goal     Problem: Vaginal Delivery: Postpartum Day 1  Goal: Off Pathway (Use only if patient is Off Pathway)  Outcome: Progressing Towards Goal  Goal: Activity/Safety  Outcome: Progressing Towards Goal  Goal: Consults, if ordered  Outcome: Progressing Towards Goal  Goal: Diagnostic Test/Procedures  Outcome: Progressing Towards Goal  Goal: Nutrition/Diet  Outcome: Progressing Towards Goal  Goal: Discharge Planning  Outcome: Progressing Towards Goal  Goal: Medications  Outcome: Progressing Towards Goal  Goal: Treatments/Interventions/Procedures  Outcome: Progressing Towards Goal  Goal: Psychosocial  Outcome: Progressing Towards Goal  Goal: *Vital signs within defined limits  Outcome: Progressing Towards Goal  Goal: *Labs within defined limits  Outcome: Progressing Towards Goal  Goal: *Hemodynamically stable  Outcome: Progressing Towards Goal  Goal: *Optimal pain control at patient's stated goal  Outcome: Progressing Towards Goal  Goal: *Participates in infant care  Outcome: Progressing Towards Goal  Goal: *Demonstrates progressive activity  Outcome: Progressing Towards Goal  Goal: *Performs self perineal care  Outcome: Progressing Towards Goal  Goal: *Appropriate parent-infant bonding  Outcome: Progressing Towards Goal  Goal: *Tolerating diet  Outcome: Progressing Towards Goal  Goal: *Performs self breast care  Outcome: Progressing Towards Goal     Problem: Vaginal Delivery: Postpartum 2  Goal: Off Pathway (Use only if patient is Off Pathway)  Outcome: Progressing Towards Goal  Goal: Activity/Safety  Outcome: Progressing Towards Goal  Goal: Consults, if ordered  Outcome: Progressing Towards Goal  Goal: Nutrition/Diet  Outcome: Progressing Towards Goal  Goal: Discharge Planning  Outcome: Progressing Towards Goal  Goal: Medications  Outcome: Progressing Towards Goal  Goal: Treatments/Interventions/Procedures  Outcome: Progressing Towards Goal  Goal: Psychosocial  Outcome: Progressing Towards Goal     Problem: Vaginal Delivery: Discharge Outcomes  Goal: *Verbalizes name, dosage, time, side effects, and number of days to continue medications  Outcome: Progressing Towards Goal  Goal: *Describes available resources and support systems  Outcome: Progressing Towards Goal  Goal: *No signs and symptoms of infection  Outcome: Progressing Towards Goal  Goal: *Birth certificate information completed  Outcome: Progressing Towards Goal  Goal: *Received and verbalizes understanding of discharge plan and instructions  Outcome: Progressing Towards Goal  Goal: *Vital signs within defined limits  Outcome: Progressing Towards Goal  Goal: *Labs within defined limits  Outcome: Progressing Towards Goal  Goal: *Hemodynamically stable  Outcome: Progressing Towards Goal  Goal: *Optimal pain control at patient's stated goal  Outcome: Progressing Towards Goal  Goal: *Participates in infant care  Outcome: Progressing Towards Goal  Goal: *Demonstrates progressive activity  Outcome: Progressing Towards Goal  Goal: *Appropriate parent-infant bonding  Outcome: Progressing Towards Goal  Goal: *Tolerating diet  Outcome: Progressing Towards Goal     Problem: Falls - Risk of  Goal: *Absence of Falls  Description: Document Girish Fall Risk and appropriate interventions in the flowsheet.   Outcome: Progressing Towards Goal  Note: Fall Risk Interventions:            Medication Interventions: Assess postural VS orthostatic hypotension                   Problem: Patient Education: Go to Patient Education Activity  Goal: Patient/Family Education  Outcome: Progressing Towards Goal

## 2022-04-04 NOTE — PROGRESS NOTES
Progress Note    Patient: Rayo Hoff MRN: [id number]  SSN: [id number]    YOB: 1995  Age: 23 y.o. Sex: female      Subjective:     Postpartum Day: 1     Delivery: vaginal delivery    The patient feels well. The patient denies emotional concerns. The baby is on cpap in nicu. Baby is feeding via breast.  The patient is ambulating well. The patient  tolerating a normal diet. Flatus has been passed. Objective:      Patient Vitals for the past 8 hrs:   BP Temp Pulse Resp SpO2   04/04/22 0823 119/63 98 °F (36.7 °C) 79 17 100 %     LABS: Recent Results (from the past 24 hour(s))   METABOLIC PANEL, BASIC    Collection Time: 04/04/22  3:42 AM   Result Value Ref Range    Sodium 142 136 - 145 mmol/L    Potassium 4.3 3.5 - 5.5 mmol/L    Chloride 111 100 - 111 mmol/L    CO2 24 21 - 32 mmol/L    Anion gap 7 3.0 - 18 mmol/L    Glucose 95 74 - 99 mg/dL    BUN 15 7.0 - 18 MG/DL    Creatinine 1.04 0.6 - 1.3 MG/DL    BUN/Creatinine ratio 14 12 - 20      GFR est AA >60 >60 ml/min/1.73m2    GFR est non-AA >60 >60 ml/min/1.73m2    Calcium 8.1 (L) 8.5 - 10.1 MG/DL   HGB & HCT    Collection Time: 04/04/22  3:42 AM   Result Value Ref Range    HGB 9.8 (L) 12.0 - 16.0 g/dL    HCT 30.7 (L) 35.0 - 45.0 %          Lochia:  appropriate   Uterine Fundus:   firm   Fundus Location:  -1   Incision:  no significant drainage   DVT Evaluation:  No evidence of DVT seen on physical exam.     Lab/Data Review: All lab results for the last 24 hours reviewed. Assessment:     Status post: Doing well postpartum vaginal delivery     Plan:     Postpartum care discussed including diet, ambulation, and actvitiy restrictions. Discharge instructions and questions answered for vaginal delivery.     Signed By: Syeda Leggett MD     April 4, 2022

## 2022-04-04 NOTE — PROGRESS NOTES
1905 Bedside and Verbal shift change report given to ALFONSO Hernandez RN and MICHAEL BETHEA (oncoming nurse) by She Landis (offgoing nurse). Report included the following information SBAR, Intake/Output, MAR and Recent Results. 1920 Supervised student nurse perform assessment. 2030 Patient given syringes for pumped breast milk. Patient going to go see infant in NICU.    2303 Midwife called to see if patient could be switched to oral antibiotics due to her IV getting infiltrated towards the end of her ampicillin dose that was due at 2200. Midwife aware that patient has been afebrile without taking any medications that may reduce a fever since 1300. Midwife said it is okay to switch patient to oral antibiotics. 2679 Patient given scheduled antibiotic.    0615 Patient in NICU. Patient given oral antibiotics. 9171 Verbal shift change report given to JAIMEE Frye RN (oncoming nurse) by Rufus Nageotte RN (offgoing nurse). Report included the following information SBAR, Kardex, Procedure Summary, Intake/Output, MAR and Recent Results.

## 2022-04-05 VITALS
TEMPERATURE: 98.2 F | HEART RATE: 60 BPM | SYSTOLIC BLOOD PRESSURE: 126 MMHG | DIASTOLIC BLOOD PRESSURE: 60 MMHG | OXYGEN SATURATION: 100 % | BODY MASS INDEX: 41.77 KG/M2 | WEIGHT: 227 LBS | HEIGHT: 62 IN | RESPIRATION RATE: 18 BRPM

## 2022-04-05 PROCEDURE — 74011250637 HC RX REV CODE- 250/637: Performed by: MIDWIFE

## 2022-04-05 RX ADMIN — IBUPROFEN 800 MG: 400 TABLET ORAL at 08:17

## 2022-04-05 NOTE — PROGRESS NOTES
Progress Note    Patient: Felicia Goodman MRN: [id number]  SSN: [id number]    YOB: 1995  Age: 23 y.o. Sex: female    ROOM:  249/01      Subjective:     Postpartum Day: 2            Delivery: vaginal delivery    The patient feels well. The patient denies emotional concerns. The baby is on abx in NICU. The patient is ambulating well. The patient  tolerating a normal diet. Flatus has been passed. Objective:      Patient Vitals for the past 24 hrs:   BP Temp Pulse Resp SpO2   04/04/22 2330 (!) 116/53 97.5 °F (36.4 °C) 62 18 100 %   04/04/22 1731 114/66 97.8 °F (36.6 °C) 64 20 100 %   04/04/22 1514 113/60 97.6 °F (36.4 °C) 94 17 100 %   04/04/22 0823 119/63 98 °F (36.7 °C) 79 17 100 %     Lochia:  appropriate   Uterine Fundus:   firm   Fundus Location:  -3   Incision:      DVT Evaluation:  No evidence of DVT seen on physical exam.  Negative Jolanta's sign. No cords or calf tenderness. No significant calf/ankle edema. Lab/Data Review: All lab results for the last 24 hours reviewed. LABS: Recent Results (from the past 48 hour(s))   METABOLIC PANEL, BASIC    Collection Time: 04/04/22  3:42 AM   Result Value Ref Range    Sodium 142 136 - 145 mmol/L    Potassium 4.3 3.5 - 5.5 mmol/L    Chloride 111 100 - 111 mmol/L    CO2 24 21 - 32 mmol/L    Anion gap 7 3.0 - 18 mmol/L    Glucose 95 74 - 99 mg/dL    BUN 15 7.0 - 18 MG/DL    Creatinine 1.04 0.6 - 1.3 MG/DL    BUN/Creatinine ratio 14 12 - 20      GFR est AA >60 >60 ml/min/1.73m2    GFR est non-AA >60 >60 ml/min/1.73m2    Calcium 8.1 (L) 8.5 - 10.1 MG/DL   HGB & HCT    Collection Time: 04/04/22  3:42 AM   Result Value Ref Range    HGB 9.8 (L) 12.0 - 16.0 g/dL    HCT 30.7 (L) 35.0 - 45.0 %        Assessment:     Status post: Doing well postpartum vaginal delivery     Plan:     Postpartum care discussed including diet, ambulation, and actvitiy restrictions. Home this evening, room in if possible.  Discharge instructions and questions answered.        Signed By: Vannesa Cortez MD     April 5, 2022

## 2022-04-05 NOTE — DISCHARGE INSTRUCTIONS
Patient Education     Postpartum: Care Instructions  Overview  After childbirth (postpartum period), your body goes through many changes. Some of these changes happen over several weeks. In the hours after delivery, your body will begin to recover from childbirth while it prepares to breastfeed your . You may feel emotional during this time. Your hormones can shift your mood without warning for no clear reason. In the first couple of weeks after childbirth, it's common to have emotions that change from happy to sad. You may find it hard to sleep. You may cry a lot. This is called the \"baby blues. \" These overwhelming emotions often go away within a couple of days or weeks. But it's important to discuss your feelings with your doctor. It's easy to get too tired and overwhelmed during the first weeks after childbirth. Don't try to do too much. Get rest whenever you can, accept help from others, and eat well and drink plenty of fluids. In the first couple of weeks after you give birth, your doctor or midwife may want to check in with you and make a plan for any follow-up care you may need. You will likely have a complete postpartum visit in the first 3 months after delivery. At that time, your doctor or midwife will check on your recovery from childbirth and see how you're doing with your emotions. You may also discuss your concerns or questions. Follow-up care is a key part of your treatment and safety. Be sure to make and go to all appointments, and call your doctor if you are having problems. It's also a good idea to know your test results and keep a list of the medicines you take. How can you care for yourself at home? · Sleep or rest when your baby sleeps. · Get help with household chores from family or friends, if you can. Don't try to do it all yourself. · If you have hemorrhoids or swelling or pain around the opening of your vagina, try using cold and heat.  You can put ice or a cold pack on the area for 10 to 20 minutes at a time. Put a thin cloth between the ice and your skin. Also try sitting in a few inches of warm water (sitz bath) 3 times a day and after bowel movements. · Take pain medicines exactly as directed. ? If the doctor gave you a prescription medicine for pain, take it as prescribed. ? If you are not taking a prescription pain medicine, ask your doctor if you can take an over-the-counter medicine. · Eat more fiber to avoid constipation. Include foods such as whole-grain breads and cereals, raw vegetables, raw and dried fruits, and beans. · Drink plenty of fluids. If you have kidney, heart, or liver disease and have to limit fluids, talk with your doctor before you increase the amount of fluids you drink. · Do not rinse inside your vagina with fluids (douche). · If you have stitches, keep the area clean by pouring or spraying warm water over the area outside your vagina and anus after you use the toilet. · Keep a list of questions to ask your doctor or midwife. Your questions might be about:  ? Changes in your breasts, such as lumps or soreness. ? When to expect your menstrual period to start again. ? What form of birth control is best for you. ? Weight you have put on during the pregnancy. ? Exercise options. ? What foods and drinks are best for you, especially if you are breastfeeding. ? Problems you might be having with breastfeeding. ? When you can have sex. You may want to talk about lubricants for your vagina. ? Any feelings of sadness or restlessness that you are having. When should you call for help? Share this information with your partner, family, or a friend. They can help you watch for warning signs. Call 911  anytime you think you may need emergency care.  For example, call if:    · You have thoughts of harming yourself, your baby, or another person.     · You passed out (lost consciousness).     · You have chest pain, are short of breath, or cough up blood.     · You have a seizure. Call your doctor now or seek immediate medical care if:    · You have signs of hemorrhage (too much bleeding), such as:  ? Heavy vaginal bleeding. This means that you are soaking through one or more pads in an hour. Or you pass blood clots bigger than an egg. ? Feeling dizzy or lightheaded, or you feel like you may faint. ? Feeling so tired or weak that you cannot do your usual activities. ? A fast or irregular heartbeat. ? New or worse belly pain.     · You have signs of infection, such as:  ? A fever. ? Vaginal discharge that smells bad.  ? New or worse belly pain.     · You have symptoms of a blood clot in your leg (called a deep vein thrombosis), such as:  ? Pain in the calf, back of the knee, thigh, or groin. ? Redness and swelling in your leg or groin.     · You have signs of preeclampsia, such as:  ? Sudden swelling of your face, hands, or feet. ? New vision problems (such as dimness, blurring, or seeing spots). ? A severe headache. Watch closely for changes in your health, and be sure to contact your doctor if:    · Your vaginal bleeding isn't decreasing.     · You feel sad, anxious, or hopeless for more than a few days.     · You are having problems with your breasts or breastfeeding. Where can you learn more? Go to http://www.gray.com/  Enter C875 in the search box to learn more about \"Postpartum: Care Instructions. \"  Current as of: June 16, 2021               Content Version: 13.2  © 2006-2022 [employer]. Care instructions adapted under license by [employer] (which disclaims liability or warranty for this information). If you have questions about a medical condition or this instruction, always ask your healthcare professional. [place] [address] any warranty or liability for your use of this information.

## 2022-04-05 NOTE — PROGRESS NOTES
Problem: Vaginal Delivery: Postpartum 2  Goal: Activity/Safety  Outcome: Progressing Towards Goal  Goal: Consults, if ordered  Outcome: Progressing Towards Goal  Goal: Nutrition/Diet  Outcome: Progressing Towards Goal  Goal: Discharge Planning  Outcome: Progressing Towards Goal  Goal: Medications  Outcome: Progressing Towards Goal  Goal: Treatments/Interventions/Procedures  Outcome: Progressing Towards Goal  Goal: Psychosocial  Outcome: Progressing Towards Goal     Problem: Falls - Risk of  Goal: *Absence of Falls  Description: Document Girish Fall Risk and appropriate interventions in the flowsheet.   Outcome: Progressing Towards Goal  Note: Fall Risk Interventions:            Medication Interventions: Assess postural VS orthostatic hypotension                   Problem: Pain  Goal: *Control of Pain  Outcome: Progressing Towards Goal

## 2022-04-05 NOTE — PROGRESS NOTES
1920 Bedside shift change report given to Maricruz King, RN (oncoming nurse) by Stephen Hollingsworth RN (offgoing nurse). Report included the following information SBAR, Intake/Output, MAR and Recent Results. 2019 Pt. AAOx4. Pain 3/10. Fundus firm at U-1, scant rubra lochia. No clots noted. Educated on signs and symptoms to report and plan of care. No further questions or concerns at this time. Callbell within reach. Bed in lowest position. 2026 Pt administered 2 tabs tylenol. 2140 Pt in nicu visiting infant. 2345 Pt sitting in bed and water given as requested. 0225 Pt sleeping with call bell in reach. 0310 Pt ambulated to nourishment room. 6473 Pt sleeping with call bell in reach. 0725 Bedside shift change report given to Jackson Mcdonald RN (oncoming nurse) by Maricruz King RN (offgoing nurse). Report included the following information SBAR, Intake/Output, MAR and Recent Results.

## 2022-04-05 NOTE — PROGRESS NOTES
Problem: Vaginal Delivery: Postpartum Day 1  Goal: Activity/Safety  Outcome: Progressing Towards Goal  Goal: Discharge Planning  Outcome: Progressing Towards Goal  Goal: Medications  Outcome: Progressing Towards Goal  Goal: Treatments/Interventions/Procedures  Outcome: Progressing Towards Goal  Goal: Psychosocial  Outcome: Progressing Towards Goal  Goal: *Vital signs within defined limits  Outcome: Progressing Towards Goal  Goal: *Labs within defined limits  Outcome: Progressing Towards Goal  Goal: *Hemodynamically stable  Outcome: Progressing Towards Goal  Goal: *Optimal pain control at patient's stated goal  Outcome: Progressing Towards Goal  Goal: *Participates in infant care  Outcome: Progressing Towards Goal  Goal: *Demonstrates progressive activity  Outcome: Progressing Towards Goal  Goal: *Appropriate parent-infant bonding  Outcome: Progressing Towards Goal  Goal: *Tolerating diet  Outcome: Progressing Towards Goal     Problem: Pain  Goal: *Control of Pain  Outcome: Progressing Towards Goal

## 2022-04-05 NOTE — PROGRESS NOTES
0725 Bedside and Verbal shift change report given to Felix Cano RN (oncoming nurse) by Roopa Borges RN (offgoing nurse). Report included the following information SBAR, Kardex, Intake/Output, MAR and Recent Results. Pt observed resting in bed with HOB elevated, chest rise and fall and RR greater than 10.    0817 Pt C/O 8/10 abd pain, PRN IBU administered, will follow up on effectiveness. 9325 Assessment completed, Pt states pain has subsided to a 3/10, trace edema observed to BLE, encouraged elevation with pillows. 1304 AVS and discharge teachings reviewed and e-signed, pt discharged home in stable condition.

## 2022-06-30 ENCOUNTER — APPOINTMENT (OUTPATIENT)
Dept: ULTRASOUND IMAGING | Age: 27
End: 2022-06-30
Attending: PHYSICIAN ASSISTANT
Payer: COMMERCIAL

## 2022-06-30 ENCOUNTER — HOSPITAL ENCOUNTER (EMERGENCY)
Age: 27
Discharge: HOME | End: 2022-06-30
Attending: STUDENT IN AN ORGANIZED HEALTH CARE EDUCATION/TRAINING PROGRAM
Payer: COMMERCIAL

## 2022-06-30 ENCOUNTER — APPOINTMENT (OUTPATIENT)
Dept: CT IMAGING | Age: 27
End: 2022-06-30
Attending: PHYSICIAN ASSISTANT
Payer: COMMERCIAL

## 2022-06-30 VITALS
OXYGEN SATURATION: 100 % | HEIGHT: 62 IN | TEMPERATURE: 97.8 F | BODY MASS INDEX: 37.17 KG/M2 | SYSTOLIC BLOOD PRESSURE: 121 MMHG | WEIGHT: 202 LBS | DIASTOLIC BLOOD PRESSURE: 75 MMHG | HEART RATE: 76 BPM | RESPIRATION RATE: 14 BRPM

## 2022-06-30 DIAGNOSIS — R10.11 ABDOMINAL PAIN, RIGHT UPPER QUADRANT: Primary | ICD-10-CM

## 2022-06-30 LAB
ALBUMIN SERPL-MCNC: 3.7 G/DL (ref 3.4–5)
ALBUMIN/GLOB SERPL: 0.7 {RATIO} (ref 0.8–1.7)
ALP SERPL-CCNC: 107 U/L (ref 45–117)
ALT SERPL-CCNC: 27 U/L (ref 13–56)
ANION GAP SERPL CALC-SCNC: 5 MMOL/L (ref 3–18)
APPEARANCE UR: ABNORMAL
AST SERPL-CCNC: 20 U/L (ref 10–38)
BACTERIA URNS QL MICRO: ABNORMAL /HPF
BASOPHILS # BLD: 0 K/UL (ref 0–0.1)
BASOPHILS NFR BLD: 0 % (ref 0–2)
BILIRUB SERPL-MCNC: 0.7 MG/DL (ref 0.2–1)
BILIRUB UR QL: NEGATIVE
BUN SERPL-MCNC: 16 MG/DL (ref 7–18)
BUN/CREAT SERPL: 15 (ref 12–20)
CALCIUM SERPL-MCNC: 9.4 MG/DL (ref 8.5–10.1)
CHLORIDE SERPL-SCNC: 104 MMOL/L (ref 100–111)
CO2 SERPL-SCNC: 27 MMOL/L (ref 21–32)
COLOR UR: YELLOW
CREAT SERPL-MCNC: 1.1 MG/DL (ref 0.6–1.3)
DIFFERENTIAL METHOD BLD: ABNORMAL
EOSINOPHIL # BLD: 0.2 K/UL (ref 0–0.4)
EOSINOPHIL NFR BLD: 2 % (ref 0–5)
EPITH CASTS URNS QL MICRO: ABNORMAL /LPF (ref 0–5)
ERYTHROCYTE [DISTWIDTH] IN BLOOD BY AUTOMATED COUNT: 14 % (ref 11.6–14.5)
GLOBULIN SER CALC-MCNC: 5 G/DL (ref 2–4)
GLUCOSE SERPL-MCNC: 95 MG/DL (ref 74–99)
GLUCOSE UR STRIP.AUTO-MCNC: NEGATIVE MG/DL
HCG UR QL: NEGATIVE
HCT VFR BLD AUTO: 44.8 % (ref 35–45)
HGB BLD-MCNC: 14.3 G/DL (ref 12–16)
HGB UR QL STRIP: NEGATIVE
IMM GRANULOCYTES # BLD AUTO: 0 K/UL (ref 0–0.04)
IMM GRANULOCYTES NFR BLD AUTO: 1 % (ref 0–0.5)
KETONES UR QL STRIP.AUTO: NEGATIVE MG/DL
LEUKOCYTE ESTERASE UR QL STRIP.AUTO: ABNORMAL
LIPASE SERPL-CCNC: 97 U/L (ref 73–393)
LYMPHOCYTES # BLD: 1.6 K/UL (ref 0.9–3.6)
LYMPHOCYTES NFR BLD: 18 % (ref 21–52)
MCH RBC QN AUTO: 27.1 PG (ref 24–34)
MCHC RBC AUTO-ENTMCNC: 31.9 G/DL (ref 31–37)
MCV RBC AUTO: 85 FL (ref 78–100)
MONOCYTES # BLD: 0.5 K/UL (ref 0.05–1.2)
MONOCYTES NFR BLD: 6 % (ref 3–10)
NEUTS SEG # BLD: 6.2 K/UL (ref 1.8–8)
NEUTS SEG NFR BLD: 72 % (ref 40–73)
NITRITE UR QL STRIP.AUTO: NEGATIVE
NRBC # BLD: 0 K/UL (ref 0–0.01)
NRBC BLD-RTO: 0 PER 100 WBC
PH UR STRIP: 5.5 [PH] (ref 5–8)
PLATELET # BLD AUTO: 388 K/UL (ref 135–420)
PMV BLD AUTO: 9.1 FL (ref 9.2–11.8)
POTASSIUM SERPL-SCNC: 4 MMOL/L (ref 3.5–5.5)
PROT SERPL-MCNC: 8.7 G/DL (ref 6.4–8.2)
PROT UR STRIP-MCNC: NEGATIVE MG/DL
RBC # BLD AUTO: 5.27 M/UL (ref 4.2–5.3)
SODIUM SERPL-SCNC: 136 MMOL/L (ref 136–145)
SP GR UR REFRACTOMETRY: 1.02 (ref 1–1.03)
UROBILINOGEN UR QL STRIP.AUTO: 0.2 EU/DL (ref 0.2–1)
WBC # BLD AUTO: 8.5 K/UL (ref 4.6–13.2)
WBC URNS QL MICRO: ABNORMAL /HPF (ref 0–5)

## 2022-06-30 PROCEDURE — 80053 COMPREHEN METABOLIC PANEL: CPT

## 2022-06-30 PROCEDURE — 87086 URINE CULTURE/COLONY COUNT: CPT

## 2022-06-30 PROCEDURE — 81025 URINE PREGNANCY TEST: CPT

## 2022-06-30 PROCEDURE — 96375 TX/PRO/DX INJ NEW DRUG ADDON: CPT

## 2022-06-30 PROCEDURE — 81001 URINALYSIS AUTO W/SCOPE: CPT

## 2022-06-30 PROCEDURE — 74011000636 HC RX REV CODE- 636: Performed by: STUDENT IN AN ORGANIZED HEALTH CARE EDUCATION/TRAINING PROGRAM

## 2022-06-30 PROCEDURE — 99285 EMERGENCY DEPT VISIT HI MDM: CPT

## 2022-06-30 PROCEDURE — 74177 CT ABD & PELVIS W/CONTRAST: CPT

## 2022-06-30 PROCEDURE — 76705 ECHO EXAM OF ABDOMEN: CPT

## 2022-06-30 PROCEDURE — 83690 ASSAY OF LIPASE: CPT

## 2022-06-30 PROCEDURE — 96374 THER/PROPH/DIAG INJ IV PUSH: CPT

## 2022-06-30 PROCEDURE — 74011250636 HC RX REV CODE- 250/636: Performed by: PHYSICIAN ASSISTANT

## 2022-06-30 PROCEDURE — 85025 COMPLETE CBC W/AUTO DIFF WBC: CPT

## 2022-06-30 RX ORDER — SODIUM CHLORIDE 9 MG/ML
150 INJECTION, SOLUTION INTRAVENOUS ONCE
Status: COMPLETED | OUTPATIENT
Start: 2022-06-30 | End: 2022-06-30

## 2022-06-30 RX ORDER — FAMOTIDINE 10 MG/ML
20 INJECTION INTRAVENOUS
Status: COMPLETED | OUTPATIENT
Start: 2022-06-30 | End: 2022-06-30

## 2022-06-30 RX ORDER — FAMOTIDINE 20 MG/1
20 TABLET, FILM COATED ORAL 2 TIMES DAILY
Qty: 20 TABLET | Refills: 0 | Status: SHIPPED | OUTPATIENT
Start: 2022-06-30 | End: 2022-07-10

## 2022-06-30 RX ORDER — KETOROLAC TROMETHAMINE 30 MG/ML
30 INJECTION, SOLUTION INTRAMUSCULAR; INTRAVENOUS
Status: COMPLETED | OUTPATIENT
Start: 2022-06-30 | End: 2022-06-30

## 2022-06-30 RX ORDER — TRAMADOL HYDROCHLORIDE 50 MG/1
50 TABLET ORAL
Qty: 12 TABLET | Refills: 0 | OUTPATIENT
Start: 2022-06-30 | End: 2022-07-03

## 2022-06-30 RX ORDER — ONDANSETRON 4 MG/1
4 TABLET, ORALLY DISINTEGRATING ORAL
Qty: 10 TABLET | Refills: 0 | OUTPATIENT
Start: 2022-06-30 | End: 2022-07-03

## 2022-06-30 RX ADMIN — FAMOTIDINE 20 MG: 10 INJECTION, SOLUTION INTRAVENOUS at 12:51

## 2022-06-30 RX ADMIN — KETOROLAC TROMETHAMINE 30 MG: 30 INJECTION, SOLUTION INTRAMUSCULAR at 12:51

## 2022-06-30 RX ADMIN — SODIUM CHLORIDE 150 ML/HR: 9 INJECTION, SOLUTION INTRAVENOUS at 12:51

## 2022-06-30 RX ADMIN — IOPAMIDOL 100 ML: 612 INJECTION, SOLUTION INTRAVENOUS at 17:43

## 2022-06-30 NOTE — ED TRIAGE NOTES
Patient reports she is having abdominal pain and nausea, vomiting and diarrhea for a couple of days per patient. Patient reports eating something makes it worse.

## 2022-06-30 NOTE — ED PROVIDER NOTES
EMERGENCY DEPARTMENT HISTORY AND PHYSICAL EXAM    Date: 6/30/2022  Patient Name: Dia Davey    History of Presenting Illness     Time Seen: 12:41 PM    Chief Complaint   Patient presents with    Abdominal Pain       History Provided By: Patient    Additional History (Context):   Dia Davey is a 23 y.o. female who presents to the emergency room with c/o epigastric, right upper quadrant abdominal pain that has been ongoing since the delivery of her child 3 months ago. Daily discomfort. Pain worse after eating. Pain comes on almost immediately after eating any food. Nonradiating abdominal pain. Afebrile. Has had associated nausea with vomiting. Also loose stools. No history of abdominal issues free pregnancy or during the pregnancy. PCP: Pamela Lemus, DO    Current Outpatient Medications   Medication Sig Dispense Refill    famotidine (Pepcid) 20 mg tablet Take 1 Tablet by mouth two (2) times a day for 10 days. 20 Tablet 0    ondansetron (ZOFRAN ODT) 4 mg disintegrating tablet Take 1 Tablet by mouth every eight (8) hours as needed for Nausea or Vomiting. 10 Tablet 0    traMADoL (Ultram) 50 mg tablet Take 1 Tablet by mouth every six (6) hours as needed for Pain for up to 3 days. Max Daily Amount: 200 mg. 12 Tablet 0    prenatal multivit-ca-min-fe-fa (Prenatal Vitamin) tab Take 1 Tablet by mouth daily. 30 Tablet 0       Past History     Past Medical History:  Past Medical History:   Diagnosis Date    Abnormal Papanicolaou smear of cervix     2018, 2019    Interstitial cystitis     Interstitial cystitis     Nicotine vapor product user        Past Surgical History:  Past Surgical History:   Procedure Laterality Date    HX REFRACTIVE SURGERY         Family History:  History reviewed. No pertinent family history.     Social History:  Social History     Tobacco Use    Smoking status: Never Smoker    Smokeless tobacco: Never Used   Vaping Use    Vaping Use: Not on file Substance Use Topics    Alcohol use: Not Currently     Comment: social    Drug use: Yes     Types: Marijuana     Comment: smokes once a day       Allergies:  No Known Allergies      Review of Systems   Review of Systems   Constitutional: Negative for chills, fatigue and fever. Respiratory: Negative for shortness of breath. Cardiovascular: Negative for chest pain and palpitations. Gastrointestinal: Positive for abdominal pain, diarrhea, nausea and vomiting. Genitourinary: Negative for decreased urine volume, dysuria, flank pain, frequency, hematuria and urgency. Musculoskeletal: Negative for back pain. All other systems reviewed and are negative. Physical Exam     Vitals:    06/30/22 1701 06/30/22 1702 06/30/22 1703 06/30/22 1704   BP:       Pulse:       Resp:       Temp:       SpO2: 100% 100% 100% 100%   Weight:       Height:         Physical Exam  Vitals reviewed. Constitutional:       General: She is not in acute distress. Appearance: She is well-developed, well-groomed and overweight. She is not ill-appearing or diaphoretic. Comments: 69-year-old female no apparent distress. Vital signs are stable. Cooperative. HENT:      Mouth/Throat:      Mouth: Mucous membranes are moist.      Pharynx: Oropharynx is clear. Eyes:      Extraocular Movements: Extraocular movements intact. Conjunctiva/sclera: Conjunctivae normal.      Pupils: Pupils are equal, round, and reactive to light. Cardiovascular:      Rate and Rhythm: Normal rate and regular rhythm. Heart sounds: Normal heart sounds. Pulmonary:      Effort: Pulmonary effort is normal.      Breath sounds: Normal breath sounds. Abdominal:      General: Bowel sounds are normal. There is no distension. Palpations: Abdomen is soft. There is no hepatomegaly or splenomegaly. Tenderness: There is generalized abdominal tenderness and tenderness in the right upper quadrant and epigastric area.  There is guarding and rebound. There is no right CVA tenderness or left CVA tenderness. Positive signs include Nixon's sign. Negative signs include McBurney's sign. Hernia: No hernia is present. Comments: Slightly rounded abdomen   Musculoskeletal:      Cervical back: Neck supple. Skin:     General: Skin is warm and dry. Neurological:      Mental Status: She is alert and oriented to person, place, and time. Psychiatric:         Behavior: Behavior is cooperative. Nursing note and vitals reviewed      Diagnostic Study Results     Labs -   No results found for this or any previous visit (from the past 24 hour(s)). Radiologic Studies   CT ABD PELV W CONT   Final Result      1. No acute pathology within the abdomen or pelvis. 2. Contracted gallbladder contains high density material, sludge/tiny stones. Gallbladder wall is thin and there is no pericholecystic stranding. 3. Minimal nonspecific free pelvic fluid, typically physiologic. Uterus is   present. Crenulated follicle right adnexa measures approximately 16 mm. [place] Stands US ABD LTD   Final Result      1. Limited visibility study. The gallbladder is not well seen and there is   questionable contracted gallbladder filled with echogenic sludge without evident   shadowing stones. The patient states has been npo since yesterday. Cannot   entirely exclude redundant, nonperistalsing bowel obscures the gallbladder. 2. Small amount of peripancreatic fluid, extending to the margin of the   gallbladder fossa. No specific inflammatory etiology identified sonographically. CT Results  (Last 48 hours)               06/30/22 1747  CT ABD PELV W CONT Final result    Impression:      1. No acute pathology within the abdomen or pelvis. 2. Contracted gallbladder contains high density material, sludge/tiny stones. Gallbladder wall is thin and there is no pericholecystic stranding.        3. Minimal nonspecific free pelvic fluid, typically physiologic. Uterus is   present. Crenulated follicle right adnexa measures approximately 16 mm. .       Narrative:  EXAM: CT of the abdomen and pelvis       INDICATION: Abdominal pain       COMPARISON: None. TECHNIQUE: Axial CT imaging of the abdomen and pelvis was performed with   intravenous contrast. Multiplanar reformats were generated. One or more dose   reduction techniques were used on this CT: automated exposure control,   adjustment of the mAs and/or kVp according to patient size, and iterative   reconstruction techniques. The specific techniques used on this CT exam have   been documented in the patient's electronic medical record. Digital Imaging and   Communications in the Medicine (DICOM) format image data are available to   nonaffiliated external healthcare facilities or entities on a secure, media   free, reciprocally searchable basis with patient authorization for at least a 12   month period after this study. _______________       FINDINGS:       LOWER CHEST: Unremarkable. LIVER, BILIARY: Liver is unremarkable. No biliary dilation. Contracted   gallbladder contains high density material, sludge/tiny stones. Gallbladder wall   is thin and there is no pericholecystic stranding. PANCREAS: Unremarkable. SPLEEN: Unremarkable. ADRENALS: Unremarkable. KIDNEYS: Unremarkable. LYMPH NODES: No enlarged lymph nodes. GASTROINTESTINAL TRACT: No bowel dilation or wall thickening. Unremarkable   appendix. PELVIC ORGANS: Minimal nonspecific free pelvic fluid, typically physiologic. Uterus is present. Crenulated follicle right adnexa measures approximately 16   mm. [place] Snare VASCULATURE: Unremarkable. BONES: No acute or aggressive osseous abnormalities identified. OTHER: None.       _______________               CXR Results  (Last 48 hours)    None            Medical Decision Making   I am the first provider for this patient.     I reviewed the vital signs, available nursing notes, past medical history, past surgical history, family history and social history. Vital Signs-Reviewed the patient's vital signs. Pulse Oximetry Analysis 100% on room air    Records Reviewed: Nursing Notes    DDX:  Gastritis, duodenitis, peptic ulcer disease, pancreatitis, cholecystitis, hepatitis    Procedures:  Procedures    ED Course:   Initial assessment performed. The patients presenting problems have been discussed, and they are in agreement with the care plan formulated and outlined with them. I have encouraged them to ask questions as they arise throughout their visit. ED Physician Discussion Note:   CBC shows normal white blood cell count, H&H. Urinalysis shows moderate leukocyte esterase. 4+ epithelial cells. 1+ bacteria. 4-10 white blood cells. Culture was sent. Suspected dirty specimen. Chemistries negative  Normal renal function  Liver function tests normal  Lipase 97  Pregnancy negative  See ultrasound and CAT scan results    Long talk with patient regarding her current issues. Strongly suspect this is biliary related most likely gallbladder although not proven completely on today's test.  Patient will be referred to see general surgery for possible cholecystectomy. For now, low-fat no fat diet. Medications for pain and nausea. Return for any worsening symptoms. Patient will also be placed on medication for her stomach -Pepcid. She states that she has an appointment to see gastroenterology. Told her to keep that appointment for now although I do not feel that is going to be her problem at this point. Discharge    Diagnosis and Disposition       DISCHARGE NOTE:  Vincent Vegaservando's  results have been reviewed with her. She has been counseled regarding her diagnosis, treatment, and plan.   She verbally conveys understanding and agreement of the signs, symptoms, diagnosis, treatment and prognosis and additionally agrees to follow up as discussed. She also agrees with the care-plan and conveys that all of her questions have been answered. I have also provided discharge instructions for her that include: educational information regarding their diagnosis and treatment, and list of reasons why they would want to return to the ED prior to their follow-up appointment, should her condition change. She has been provided with education for proper emergency department utilization. CLINICAL IMPRESSION:    1. Abdominal pain, right upper quadrant        PLAN:  1. D/C Home  2. Discharge Medication List as of 6/30/2022  6:40 PM      START taking these medications    Details   famotidine (Pepcid) 20 mg tablet Take 1 Tablet by mouth two (2) times a day for 10 days. , Normal, Disp-20 Tablet, R-0      ondansetron (ZOFRAN ODT) 4 mg disintegrating tablet Take 1 Tablet by mouth every eight (8) hours as needed for Nausea or Vomiting., Normal, Disp-10 Tablet, R-0      traMADoL (Ultram) 50 mg tablet Take 1 Tablet by mouth every six (6) hours as needed for Pain for up to 3 days. Max Daily Amount: 200 mg., Normal, Disp-12 Tablet, R-0         CONTINUE these medications which have NOT CHANGED    Details   prenatal multivit-ca-min-fe-fa (Prenatal Vitamin) tab Take 1 Tablet by mouth daily. , Normal, Disp-30 Tablet, R-0           3. Follow-up Information     Follow up With Specialties Details Why Yary Berry MD General Surgery Call  Call general surgery to discuss possible gallbladder removal [phone] [facility] EMERGENCY DEPT Emergency Medicine  If symptoms worsen, As needed [address] [id number]  [phone]        ____________________________________     Please note that this dictation was completed with [employer], the computer voice recognition software.   Quite often unanticipated grammatical, syntax, homophones, and other interpretive errors are inadvertently transcribed by the computer software. Please disregard these errors. Please excuse any errors that have escaped final proofreading.

## 2022-06-30 NOTE — DISCHARGE INSTRUCTIONS
Please contact general surgery to advise of ER visit and to set up an appointment to discuss elective gallbladder removal  Avoid fatty foods  Medications as prescribed  Worse? High fever, uncontrolled vomiting, worsening pain, jaundiced.   Return to ER immediately

## 2022-07-01 LAB
BACTERIA SPEC CULT: NORMAL
SERVICE CMNT-IMP: NORMAL

## 2022-07-03 ENCOUNTER — HOSPITAL ENCOUNTER (EMERGENCY)
Age: 27
Discharge: HOME | End: 2022-07-03
Attending: EMERGENCY MEDICINE
Payer: COMMERCIAL

## 2022-07-03 VITALS
HEIGHT: 62 IN | OXYGEN SATURATION: 97 % | SYSTOLIC BLOOD PRESSURE: 112 MMHG | HEART RATE: 70 BPM | BODY MASS INDEX: 37.17 KG/M2 | TEMPERATURE: 98.1 F | WEIGHT: 202 LBS | RESPIRATION RATE: 16 BRPM | DIASTOLIC BLOOD PRESSURE: 82 MMHG

## 2022-07-03 DIAGNOSIS — K82.8 GALLBLADDER SLUDGE: ICD-10-CM

## 2022-07-03 DIAGNOSIS — R10.13 ABDOMINAL PAIN, EPIGASTRIC: Primary | ICD-10-CM

## 2022-07-03 DIAGNOSIS — K80.50 BILIARY COLIC: ICD-10-CM

## 2022-07-03 LAB
ALBUMIN SERPL-MCNC: 3.5 G/DL (ref 3.4–5)
ALBUMIN/GLOB SERPL: 0.8 {RATIO} (ref 0.8–1.7)
ALP SERPL-CCNC: 92 U/L (ref 45–117)
ALT SERPL-CCNC: 43 U/L (ref 13–56)
ANION GAP SERPL CALC-SCNC: 5 MMOL/L (ref 3–18)
AST SERPL-CCNC: 35 U/L (ref 10–38)
BASOPHILS # BLD: 0 K/UL (ref 0–0.1)
BASOPHILS NFR BLD: 0 % (ref 0–2)
BILIRUB SERPL-MCNC: 0.4 MG/DL (ref 0.2–1)
BUN SERPL-MCNC: 16 MG/DL (ref 7–18)
BUN/CREAT SERPL: 16 (ref 12–20)
CALCIUM SERPL-MCNC: 9 MG/DL (ref 8.5–10.1)
CHLORIDE SERPL-SCNC: 108 MMOL/L (ref 100–111)
CO2 SERPL-SCNC: 23 MMOL/L (ref 21–32)
CREAT SERPL-MCNC: 1.03 MG/DL (ref 0.6–1.3)
DIFFERENTIAL METHOD BLD: ABNORMAL
EOSINOPHIL # BLD: 0.2 K/UL (ref 0–0.4)
EOSINOPHIL NFR BLD: 2 % (ref 0–5)
ERYTHROCYTE [DISTWIDTH] IN BLOOD BY AUTOMATED COUNT: 13.8 % (ref 11.6–14.5)
GLOBULIN SER CALC-MCNC: 4.5 G/DL (ref 2–4)
GLUCOSE BLD STRIP.AUTO-MCNC: 78 MG/DL (ref 70–110)
GLUCOSE BLD STRIP.AUTO-MCNC: 81 MG/DL (ref 70–110)
GLUCOSE SERPL-MCNC: 75 MG/DL (ref 74–99)
HCT VFR BLD AUTO: 39.6 % (ref 35–45)
HGB BLD-MCNC: 12.8 G/DL (ref 12–16)
IMM GRANULOCYTES # BLD AUTO: 0 K/UL (ref 0–0.04)
IMM GRANULOCYTES NFR BLD AUTO: 0 % (ref 0–0.5)
LIPASE SERPL-CCNC: 85 U/L (ref 73–393)
LYMPHOCYTES # BLD: 1.5 K/UL (ref 0.9–3.6)
LYMPHOCYTES NFR BLD: 16 % (ref 21–52)
MCH RBC QN AUTO: 27.5 PG (ref 24–34)
MCHC RBC AUTO-ENTMCNC: 32.3 G/DL (ref 31–37)
MCV RBC AUTO: 85 FL (ref 78–100)
MONOCYTES # BLD: 0.6 K/UL (ref 0.05–1.2)
MONOCYTES NFR BLD: 7 % (ref 3–10)
NEUTS SEG # BLD: 7.2 K/UL (ref 1.8–8)
NEUTS SEG NFR BLD: 75 % (ref 40–73)
NRBC # BLD: 0 K/UL (ref 0–0.01)
NRBC BLD-RTO: 0 PER 100 WBC
PLATELET # BLD AUTO: 373 K/UL (ref 135–420)
PMV BLD AUTO: 9.5 FL (ref 9.2–11.8)
POTASSIUM SERPL-SCNC: 3.9 MMOL/L (ref 3.5–5.5)
PROT SERPL-MCNC: 8 G/DL (ref 6.4–8.2)
RBC # BLD AUTO: 4.66 M/UL (ref 4.2–5.3)
SODIUM SERPL-SCNC: 136 MMOL/L (ref 136–145)
WBC # BLD AUTO: 9.6 K/UL (ref 4.6–13.2)

## 2022-07-03 PROCEDURE — 74011250636 HC RX REV CODE- 250/636: Performed by: PHYSICIAN ASSISTANT

## 2022-07-03 PROCEDURE — 96374 THER/PROPH/DIAG INJ IV PUSH: CPT

## 2022-07-03 PROCEDURE — 82962 GLUCOSE BLOOD TEST: CPT

## 2022-07-03 PROCEDURE — 83690 ASSAY OF LIPASE: CPT

## 2022-07-03 PROCEDURE — 87177 OVA AND PARASITES SMEARS: CPT

## 2022-07-03 PROCEDURE — 74011000250 HC RX REV CODE- 250: Performed by: PHYSICIAN ASSISTANT

## 2022-07-03 PROCEDURE — 99284 EMERGENCY DEPT VISIT MOD MDM: CPT

## 2022-07-03 PROCEDURE — 85025 COMPLETE CBC W/AUTO DIFF WBC: CPT

## 2022-07-03 PROCEDURE — 74011250637 HC RX REV CODE- 250/637: Performed by: PHYSICIAN ASSISTANT

## 2022-07-03 PROCEDURE — 80053 COMPREHEN METABOLIC PANEL: CPT

## 2022-07-03 PROCEDURE — 87506 IADNA-DNA/RNA PROBE TQ 6-11: CPT

## 2022-07-03 RX ORDER — METOCLOPRAMIDE 10 MG/1
10 TABLET ORAL
Qty: 12 TABLET | Refills: 0 | Status: SHIPPED | OUTPATIENT
Start: 2022-07-03

## 2022-07-03 RX ORDER — DICYCLOMINE HYDROCHLORIDE 20 MG/1
20 TABLET ORAL EVERY 6 HOURS
Qty: 12 TABLET | Refills: 0 | Status: SHIPPED | OUTPATIENT
Start: 2022-07-03

## 2022-07-03 RX ORDER — KETOROLAC TROMETHAMINE 15 MG/ML
15 INJECTION, SOLUTION INTRAMUSCULAR; INTRAVENOUS
Status: COMPLETED | OUTPATIENT
Start: 2022-07-03 | End: 2022-07-03

## 2022-07-03 RX ADMIN — ALUMINUM HYDROXIDE, MAGNESIUM HYDROXIDE, AND SIMETHICONE 40 ML: 200; 200; 20 SUSPENSION ORAL at 19:14

## 2022-07-03 RX ADMIN — KETOROLAC TROMETHAMINE 15 MG: 15 INJECTION, SOLUTION INTRAMUSCULAR; INTRAVENOUS at 19:14

## 2022-07-03 RX ADMIN — SODIUM CHLORIDE 1000 ML: 9 INJECTION, SOLUTION INTRAVENOUS at 19:14

## 2022-07-03 NOTE — ED TRIAGE NOTES
Ambulatory patient arrives with complaints of gallbladder pain, was told on Thursday that she needs it removed, couldn't get an appointment with surgeon, in too much pain

## 2022-07-03 NOTE — ED PROVIDER NOTES
EMERGENCY DEPARTMENT HISTORY AND PHYSICAL EXAM    Date: 7/3/2022  Patient Name: Zhane Brunson    History of Presenting Illness     Chief Complaint   Patient presents with    Abdominal Pain         History Provided By: Patient    6:44 PM  Ronnie Gtz is a 23 y.o. female who presents to the emergency department C/O persistent epigastric abdominal pain and diarrhea. Patient states she has been having intermittent epigastric pain for the last few months however over the past 2 weeks the pain became more frequent, severe with associated diarrhea. Patient states she has had anywhere from 5-15 episodes of diarrhea per day over the past 2 weeks. Pain seemed to worsen 4 days ago prompting her to be seen at this ED 3 days ago where she had blood work, CT and ultrasound which showed gallbladder sludge/possible tiny stones. She was prescribed Pepcid, Zofran and tramadol, but has not taken any of these medications. She states that every time she eats the pain worsens. No recent antibiotics. Pt denies fever, bloody stools, dysuria, N/V, and any other sxs or complaints. PCP: Cathryn Knight, DO    Current Outpatient Medications   Medication Sig Dispense Refill    dicyclomine (BENTYL) 20 mg tablet Take 1 Tablet by mouth every six (6) hours. 12 Tablet 0    metoclopramide HCl (Reglan) 10 mg tablet Take 1 Tablet by mouth every six (6) hours as needed for Nausea. 12 Tablet 0    famotidine (Pepcid) 20 mg tablet Take 1 Tablet by mouth two (2) times a day for 10 days. 20 Tablet 0       Past History     Past Medical History:  Past Medical History:   Diagnosis Date    Abnormal Papanicolaou smear of cervix     2018, 2019    Interstitial cystitis     Interstitial cystitis     Nicotine vapor product user     Vulvodynia        Past Surgical History:  Past Surgical History:   Procedure Laterality Date    HX REFRACTIVE SURGERY         Family History:  History reviewed.  No pertinent family history. Social History:  Social History     Tobacco Use    Smoking status: Never Smoker    Smokeless tobacco: Never Used   Vaping Use    Vaping Use: Not on file   Substance Use Topics    Alcohol use: Not Currently     Comment: social    Drug use: Yes     Types: Marijuana     Comment: smokes once a day       Allergies:  No Known Allergies      Review of Systems   Review of Systems   Constitutional: Negative. Negative for fever. Gastrointestinal: Positive for abdominal pain and diarrhea. Negative for nausea and vomiting. Genitourinary: Negative for dysuria. All other systems reviewed and are negative. Physical Exam     Vitals:    07/03/22 1827 07/03/22 2108   BP: 110/61 112/82   Pulse: 70    Resp: 16    Temp: 96.9 °F (36.1 °C) 98.1 °F (36.7 °C)   SpO2: 97%    Weight: 91.6 kg (202 lb)    Height: 5' 2\" (1.575 m)      Physical Exam  Vital signs and nursing notes reviewed. CONSTITUTIONAL: Alert. Well-appearing; well-nourished; in no apparent distress. HEAD: Normocephalic; atraumatic. EYES: Conjunctiva clear. No scleral icterus. CV: Normal S1, S2; no murmurs, rubs, or gallops. No chest wall tenderness. RESPIRATORY: Normal chest excursion with respiration; breath sounds clear and equal bilaterally; no wheezes, rhonchi, or rales. GI: Normal bowel sounds; non-distended; +TTP epigastric area with mild voluntary guarding; no rigidity; no palpable organomegaly. No CVA tenderness. EXT: Normal ROM in all four extremities; non-tender to palpation. SKIN: Normal for age and race; warm; dry; good turgor; no apparent lesions or exudate. NEURO: A & O x3. PSYCH:  Mood and affect appropriate.            Diagnostic Study Results     Labs -     Recent Results (from the past 12 hour(s))   CBC WITH AUTOMATED DIFF    Collection Time: 07/03/22  6:50 PM   Result Value Ref Range    WBC 9.6 4.6 - 13.2 K/uL    RBC 4.66 4.20 - 5.30 M/uL    HGB 12.8 12.0 - 16.0 g/dL    HCT 39.6 35.0 - 45.0 %    MCV 85.0 78.0 - 100.0 FL    MCH 27.5 24.0 - 34.0 PG    MCHC 32.3 31.0 - 37.0 g/dL    RDW 13.8 11.6 - 14.5 %    PLATELET 312 650 - 194 K/uL    MPV 9.5 9.2 - 11.8 FL    NRBC 0.0 0  WBC    ABSOLUTE NRBC 0.00 0.00 - 0.01 K/uL    NEUTROPHILS 75 (H) 40 - 73 %    LYMPHOCYTES 16 (L) 21 - 52 %    MONOCYTES 7 3 - 10 %    EOSINOPHILS 2 0 - 5 %    BASOPHILS 0 0 - 2 %    IMMATURE GRANULOCYTES 0 0.0 - 0.5 %    ABS. NEUTROPHILS 7.2 1.8 - 8.0 K/UL    ABS. LYMPHOCYTES 1.5 0.9 - 3.6 K/UL    ABS. MONOCYTES 0.6 0.05 - 1.2 K/UL    ABS. EOSINOPHILS 0.2 0.0 - 0.4 K/UL    ABS. BASOPHILS 0.0 0.0 - 0.1 K/UL    ABS. IMM. GRANS. 0.0 0.00 - 0.04 K/UL    DF AUTOMATED     METABOLIC PANEL, COMPREHENSIVE    Collection Time: 07/03/22  6:50 PM   Result Value Ref Range    Sodium 136 136 - 145 mmol/L    Potassium 3.9 3.5 - 5.5 mmol/L    Chloride 108 100 - 111 mmol/L    CO2 23 21 - 32 mmol/L    Anion gap 5 3.0 - 18 mmol/L    Glucose 75 74 - 99 mg/dL    BUN 16 7.0 - 18 MG/DL    Creatinine 1.03 0.6 - 1.3 MG/DL    BUN/Creatinine ratio 16 12 - 20      GFR est AA >60 >60 ml/min/1.73m2    GFR est non-AA >60 >60 ml/min/1.73m2    Calcium 9.0 8.5 - 10.1 MG/DL    Bilirubin, total 0.4 0.2 - 1.0 MG/DL    ALT (SGPT) 43 13 - 56 U/L    AST (SGOT) 35 10 - 38 U/L    Alk.  phosphatase 92 45 - 117 U/L    Protein, total 8.0 6.4 - 8.2 g/dL    Albumin 3.5 3.4 - 5.0 g/dL    Globulin 4.5 (H) 2.0 - 4.0 g/dL    A-G Ratio 0.8 0.8 - 1.7     LIPASE    Collection Time: 07/03/22  6:50 PM   Result Value Ref Range    Lipase 85 73 - 393 U/L   GLUCOSE, POC    Collection Time: 07/03/22  8:37 PM   Result Value Ref Range    Glucose (POC) 78 70 - 110 mg/dL   GLUCOSE, POC    Collection Time: 07/03/22  8:55 PM   Result Value Ref Range    Glucose (POC) 81 70 - 110 mg/dL       Radiologic Studies -   No orders to display     CT Results  (Last 48 hours)    None        CXR Results  (Last 48 hours)    None          Medications given in the ED-  Medications   mylanta/viscous lidocaine (GI COCKTAIL) (40 mL Oral Given 7/3/22 1914)   sodium chloride 0.9 % bolus infusion 1,000 mL (0 mL IntraVENous IV Completed 7/3/22 2058)   ketorolac (TORADOL) injection 15 mg (15 mg IntraVENous Given 7/3/22 1914)         Medical Decision Making   I am the first provider for this patient. I reviewed the vital signs, available nursing notes, past medical history, past surgical history, family history and social history. Vital Signs-Reviewed the patient's vital signs. Records Reviewed: Nursing Notes      Procedures:  Procedures    ED Course:  6:44 PM   Initial assessment performed. The patients presenting problems have been discussed, and they are in agreement with the care plan formulated and outlined with them. I have encouraged them to ask questions as they arise throughout their visit. 9:05 PM consult  Case discussed with general surgeon on-call Dr. Guicho Reynoso. She agrees no indication for emergent cholecystectomy but does recommend close follow-up and that she can see the patient on Tuesday 7/5/22 (2 days from now) at 10:30 AM.  Recommends nausea medicine, Bentyl and diet changes in the meantime. Diagnosis and Disposition       DISCHARGE NOTE:    Marlon Smith's  results have been reviewed with her. She has been counseled regarding her diagnosis, treatment, and plan. She verbally conveys understanding and agreement of the signs, symptoms, diagnosis, treatment and prognosis and additionally agrees to follow up as discussed. She also agrees with the care-plan and conveys that all of her questions have been answered. I have also provided discharge instructions for her that include: educational information regarding their diagnosis and treatment, and list of reasons why they would want to return to the ED prior to their follow-up appointment, should her condition change. She has been provided with education for proper emergency department utilization. CLINICAL IMPRESSION:    1.  Abdominal pain, epigastric    2. Biliary colic    3. Gallbladder sludge        PLAN:  1. D/C Home  2. Current Discharge Medication List      START taking these medications    Details   dicyclomine (BENTYL) 20 mg tablet Take 1 Tablet by mouth every six (6) hours. Qty: 12 Tablet, Refills: 0  Start date: 7/3/2022      metoclopramide HCl (Reglan) 10 mg tablet Take 1 Tablet by mouth every six (6) hours as needed for Nausea. Qty: 12 Tablet, Refills: 0  Start date: 7/3/2022         STOP taking these medications       ondansetron (ZOFRAN ODT) 4 mg disintegrating tablet Comments:   Reason for Stopping:         traMADoL (Ultram) 50 mg tablet Comments:   Reason for Stopping:             3.   Follow-up Information     Follow up With Specialties Details Why Contact Info    Nate Glasgow, DO Colon and Rectal Surgery  on Tuesday 7/5/22 at 10:30am [address] [facility] [address]      [facility] EMERGENCY DEPT Emergency Medicine  As needed, If symptoms worsen [address]  [facility] [id number] [phone]        _______________________________      Please note that this dictation was completed with [employer], the computer voice recognition software. Quite often unanticipated grammatical, syntax, homophones, and other interpretive errors are inadvertently transcribed by the computer software. Please disregard these errors. Please excuse any errors that have escaped final proofreading.

## 2022-07-05 LAB
CAMPYLOBACTER SPECIES, DNA: NEGATIVE
ENTEROTOXIGEN E COLI, DNA: NEGATIVE
P SHIGELLOIDES DNA STL QL NAA+PROBE: NEGATIVE
SALMONELLA SPECIES, DNA: NEGATIVE
SHIGA TOXIN PRODUCING, DNA: NEGATIVE
SHIGELLA SP+EIEC IPAH STL QL NAA+PROBE: NEGATIVE
VIBRIO SPECIES, DNA: NEGATIVE
Y. ENTEROCOLITICA, DNA: NEGATIVE

## 2022-07-11 LAB
O+P SPEC MICRO: NORMAL
O+P STL CONC: NORMAL
SPECIMEN SOURCE: NORMAL

## 2023-02-17 ENCOUNTER — HOSPITAL ENCOUNTER (EMERGENCY)
Facility: HOSPITAL | Age: 28
Discharge: LEFT BEFORE DISCHARGE | End: 2023-02-17
Attending: FAMILY MEDICINE | Admitting: FAMILY MEDICINE

## 2023-02-17 VITALS
RESPIRATION RATE: 18 BRPM | OXYGEN SATURATION: 98 % | HEART RATE: 68 BPM | TEMPERATURE: 97.4 F | WEIGHT: 199 LBS | HEIGHT: 62 IN | DIASTOLIC BLOOD PRESSURE: 70 MMHG | SYSTOLIC BLOOD PRESSURE: 124 MMHG | BODY MASS INDEX: 36.62 KG/M2

## 2023-02-17 RX ORDER — NITROFURANTOIN 25; 75 MG/1; MG/1
100 CAPSULE ORAL 2 TIMES DAILY
COMMUNITY

## 2023-02-17 ASSESSMENT — PAIN - FUNCTIONAL ASSESSMENT: PAIN_FUNCTIONAL_ASSESSMENT: 0-10

## 2023-02-17 ASSESSMENT — PAIN SCALES - GENERAL: PAINLEVEL_OUTOF10: 9

## 2023-02-17 NOTE — ED TRIAGE NOTES
Pt arrives to ed reporting worsening urinary burning and back pain that has worsened. Pt ws dx with UTI last week and is taking Macrobid.

## 2023-08-16 NOTE — PROGRESS NOTES
Problem: Vaginal Delivery: Day of Delivery-Post delivery  Goal: Activity/Safety  Outcome: Progressing Towards Goal  Goal: Consults, if ordered  Outcome: Progressing Towards Goal  Goal: Nutrition/Diet  Outcome: Progressing Towards Goal  Goal: Discharge Planning  Outcome: Progressing Towards Goal  Goal: Medications  Outcome: Progressing Towards Goal  Goal: Treatments/Interventions/Procedures  Outcome: Progressing Towards Goal  Goal: *Vital signs within defined limits  Outcome: Progressing Towards Goal  Goal: *Labs within defined limits  Outcome: Progressing Towards Goal  Goal: *Hemodynamically stable  Outcome: Progressing Towards Goal  Goal: *Optimal pain control at patient's stated goal  Outcome: Progressing Towards Goal  Goal: *Participates in infant care  Outcome: Progressing Towards Goal  Goal: *Demonstrates progressive activity  Outcome: Progressing Towards Goal  Goal: *Tolerating diet  Outcome: Progressing Towards Goal DISPLAY PLAN FREE TEXT DISPLAY PLAN FREE TEXT DISPLAY PLAN FREE TEXT

## 2024-03-04 ENCOUNTER — HOSPITAL ENCOUNTER (EMERGENCY)
Facility: HOSPITAL | Age: 29
Discharge: HOME | End: 2024-03-04
Attending: STUDENT IN AN ORGANIZED HEALTH CARE EDUCATION/TRAINING PROGRAM
Payer: COMMERCIAL

## 2024-03-04 ENCOUNTER — APPOINTMENT (OUTPATIENT)
Facility: HOSPITAL | Age: 29
End: 2024-03-04
Payer: COMMERCIAL

## 2024-03-04 VITALS
DIASTOLIC BLOOD PRESSURE: 85 MMHG | SYSTOLIC BLOOD PRESSURE: 116 MMHG | RESPIRATION RATE: 18 BRPM | BODY MASS INDEX: 40.24 KG/M2 | TEMPERATURE: 98.2 F | HEART RATE: 75 BPM | OXYGEN SATURATION: 99 % | WEIGHT: 220 LBS

## 2024-03-04 DIAGNOSIS — R11.2 NAUSEA AND VOMITING, UNSPECIFIED VOMITING TYPE: ICD-10-CM

## 2024-03-04 DIAGNOSIS — R19.7 DIARRHEA, UNSPECIFIED TYPE: ICD-10-CM

## 2024-03-04 DIAGNOSIS — R10.84 GENERALIZED ABDOMINAL PAIN: ICD-10-CM

## 2024-03-04 DIAGNOSIS — K52.9 GASTROENTERITIS: Primary | ICD-10-CM

## 2024-03-04 LAB
ALBUMIN SERPL-MCNC: 3.6 G/DL (ref 3.4–5)
ALBUMIN/GLOB SERPL: 0.8 (ref 0.8–1.7)
ALP SERPL-CCNC: 83 U/L (ref 45–117)
ALT SERPL-CCNC: 73 U/L (ref 13–56)
ANION GAP SERPL CALC-SCNC: 9 MMOL/L (ref 3–18)
APPEARANCE UR: CLEAR
AST SERPL-CCNC: 56 U/L (ref 10–38)
BASOPHILS # BLD: 0 K/UL (ref 0–0.1)
BASOPHILS NFR BLD: 0 % (ref 0–2)
BILIRUB SERPL-MCNC: 0.5 MG/DL (ref 0.2–1)
BILIRUB UR QL: NEGATIVE
BUN SERPL-MCNC: 13 MG/DL (ref 7–18)
BUN/CREAT SERPL: 12 (ref 12–20)
C COLI+JEJUNI TUF STL QL NAA+PROBE: NEGATIVE
CALCIUM SERPL-MCNC: 8.7 MG/DL (ref 8.5–10.1)
CHLORIDE SERPL-SCNC: 110 MMOL/L (ref 100–111)
CO2 SERPL-SCNC: 20 MMOL/L (ref 21–32)
COLOR UR: YELLOW
CREAT SERPL-MCNC: 1.11 MG/DL (ref 0.6–1.3)
DIFFERENTIAL METHOD BLD: NORMAL
EC STX1+STX2 GENES STL QL NAA+PROBE: NEGATIVE
EOSINOPHIL # BLD: 0.1 K/UL (ref 0–0.4)
EOSINOPHIL NFR BLD: 1 % (ref 0–5)
ERYTHROCYTE [DISTWIDTH] IN BLOOD BY AUTOMATED COUNT: 13.3 % (ref 11.6–14.5)
ETEC ELTA+ESTB GENES STL QL NAA+PROBE: NEGATIVE
GLOBULIN SER CALC-MCNC: 4.6 G/DL (ref 2–4)
GLUCOSE SERPL-MCNC: 94 MG/DL (ref 74–99)
GLUCOSE UR STRIP.AUTO-MCNC: NEGATIVE MG/DL
HCG SERPL QL: NEGATIVE
HCT VFR BLD AUTO: 41.7 % (ref 35–45)
HGB BLD-MCNC: 14.4 G/DL (ref 12–16)
HGB UR QL STRIP: NEGATIVE
IMM GRANULOCYTES # BLD AUTO: 0 K/UL (ref 0–0.04)
IMM GRANULOCYTES NFR BLD AUTO: 0 % (ref 0–0.5)
KETONES UR QL STRIP.AUTO: NEGATIVE MG/DL
LEUKOCYTE ESTERASE UR QL STRIP.AUTO: NEGATIVE
LIPASE SERPL-CCNC: 19 U/L (ref 13–75)
LYMPHOCYTES # BLD: 1.7 K/UL (ref 0.9–3.6)
LYMPHOCYTES NFR BLD: 21 % (ref 21–52)
MCH RBC QN AUTO: 29.6 PG (ref 24–34)
MCHC RBC AUTO-ENTMCNC: 34.5 G/DL (ref 31–37)
MCV RBC AUTO: 85.8 FL (ref 78–100)
MONOCYTES # BLD: 0.8 K/UL (ref 0.05–1.2)
MONOCYTES NFR BLD: 10 % (ref 3–10)
NEUTS SEG # BLD: 5.3 K/UL (ref 1.8–8)
NEUTS SEG NFR BLD: 68 % (ref 40–73)
NITRITE UR QL STRIP.AUTO: NEGATIVE
NRBC # BLD: 0 K/UL (ref 0–0.01)
NRBC BLD-RTO: 0 PER 100 WBC
P SHIGELLOIDES DNA STL QL NAA+PROBE: NEGATIVE
PH UR STRIP: 5 (ref 5–8)
PLATELET # BLD AUTO: 303 K/UL (ref 135–420)
PMV BLD AUTO: 9.2 FL (ref 9.2–11.8)
POTASSIUM SERPL-SCNC: 3.5 MMOL/L (ref 3.5–5.5)
PROT SERPL-MCNC: 8.2 G/DL (ref 6.4–8.2)
PROT UR STRIP-MCNC: NEGATIVE MG/DL
RBC # BLD AUTO: 4.86 M/UL (ref 4.2–5.3)
SALMONELLA SP SPAO STL QL NAA+PROBE: NEGATIVE
SHIGELLA SP+EIEC IPAH STL QL NAA+PROBE: NEGATIVE
SODIUM SERPL-SCNC: 139 MMOL/L (ref 136–145)
SP GR UR REFRACTOMETRY: >1.03 (ref 1–1.03)
UROBILINOGEN UR QL STRIP.AUTO: 0.2 EU/DL (ref 0.2–1)
V CHOL+PARA+VUL DNA STL QL NAA+NON-PROBE: NEGATIVE
WBC # BLD AUTO: 7.8 K/UL (ref 4.6–13.2)
Y ENTEROCOL DNA STL QL NAA+NON-PROBE: NEGATIVE

## 2024-03-04 PROCEDURE — 2580000003 HC RX 258: Performed by: STUDENT IN AN ORGANIZED HEALTH CARE EDUCATION/TRAINING PROGRAM

## 2024-03-04 PROCEDURE — 74177 CT ABD & PELVIS W/CONTRAST: CPT

## 2024-03-04 PROCEDURE — 87449 NOS EACH ORGANISM AG IA: CPT

## 2024-03-04 PROCEDURE — 87506 IADNA-DNA/RNA PROBE TQ 6-11: CPT

## 2024-03-04 PROCEDURE — 84703 CHORIONIC GONADOTROPIN ASSAY: CPT

## 2024-03-04 PROCEDURE — 80053 COMPREHEN METABOLIC PANEL: CPT

## 2024-03-04 PROCEDURE — 6360000004 HC RX CONTRAST MEDICATION: Performed by: STUDENT IN AN ORGANIZED HEALTH CARE EDUCATION/TRAINING PROGRAM

## 2024-03-04 PROCEDURE — 99285 EMERGENCY DEPT VISIT HI MDM: CPT

## 2024-03-04 PROCEDURE — 83690 ASSAY OF LIPASE: CPT

## 2024-03-04 PROCEDURE — 96375 TX/PRO/DX INJ NEW DRUG ADDON: CPT

## 2024-03-04 PROCEDURE — 85025 COMPLETE CBC W/AUTO DIFF WBC: CPT

## 2024-03-04 PROCEDURE — 87324 CLOSTRIDIUM AG IA: CPT

## 2024-03-04 PROCEDURE — 96374 THER/PROPH/DIAG INJ IV PUSH: CPT

## 2024-03-04 PROCEDURE — 81003 URINALYSIS AUTO W/O SCOPE: CPT

## 2024-03-04 PROCEDURE — 6360000002 HC RX W HCPCS: Performed by: STUDENT IN AN ORGANIZED HEALTH CARE EDUCATION/TRAINING PROGRAM

## 2024-03-04 RX ORDER — ONDANSETRON 2 MG/ML
4 INJECTION INTRAMUSCULAR; INTRAVENOUS
Status: COMPLETED | OUTPATIENT
Start: 2024-03-04 | End: 2024-03-04

## 2024-03-04 RX ORDER — ONDANSETRON 4 MG/1
4 TABLET, FILM COATED ORAL 3 TIMES DAILY PRN
Qty: 15 TABLET | Refills: 0 | Status: SHIPPED | OUTPATIENT
Start: 2024-03-04

## 2024-03-04 RX ORDER — MORPHINE SULFATE 4 MG/ML
4 INJECTION, SOLUTION INTRAMUSCULAR; INTRAVENOUS
Status: COMPLETED | OUTPATIENT
Start: 2024-03-04 | End: 2024-03-04

## 2024-03-04 RX ORDER — 0.9 % SODIUM CHLORIDE 0.9 %
1000 INTRAVENOUS SOLUTION INTRAVENOUS ONCE
Status: COMPLETED | OUTPATIENT
Start: 2024-03-04 | End: 2024-03-04

## 2024-03-04 RX ORDER — DICYCLOMINE HCL 20 MG
20 TABLET ORAL 4 TIMES DAILY
Qty: 28 TABLET | Refills: 0 | Status: SHIPPED | OUTPATIENT
Start: 2024-03-04 | End: 2024-03-11

## 2024-03-04 RX ADMIN — IOPAMIDOL 100 ML: 612 INJECTION, SOLUTION INTRAVENOUS at 10:58

## 2024-03-04 RX ADMIN — MORPHINE SULFATE 4 MG: 4 INJECTION, SOLUTION INTRAMUSCULAR; INTRAVENOUS at 09:54

## 2024-03-04 RX ADMIN — ONDANSETRON 4 MG: 2 INJECTION INTRAMUSCULAR; INTRAVENOUS at 09:52

## 2024-03-04 RX ADMIN — SODIUM CHLORIDE 1000 ML: 9 INJECTION, SOLUTION INTRAVENOUS at 09:51

## 2024-03-04 ASSESSMENT — PAIN SCALES - GENERAL
PAINLEVEL_OUTOF10: 8
PAINLEVEL_OUTOF10: 8

## 2024-03-04 ASSESSMENT — ENCOUNTER SYMPTOMS
DIARRHEA: 1
VOMITING: 1
ABDOMINAL PAIN: 1
CONSTIPATION: 0
SHORTNESS OF BREATH: 0
BACK PAIN: 0
NAUSEA: 1

## 2024-03-04 ASSESSMENT — PAIN DESCRIPTION - ORIENTATION: ORIENTATION: POSTERIOR

## 2024-03-04 ASSESSMENT — PAIN DESCRIPTION - DESCRIPTORS: DESCRIPTORS: SHARP

## 2024-03-04 ASSESSMENT — PAIN DESCRIPTION - LOCATION: LOCATION: ABDOMEN

## 2024-03-04 ASSESSMENT — PAIN - FUNCTIONAL ASSESSMENT: PAIN_FUNCTIONAL_ASSESSMENT: 0-10

## 2024-03-04 NOTE — ED TRIAGE NOTES
Pt sts she has been having abd pain mid epigastric pain for last 3 days. No fever. Pt is having nvd. Sts she is having diarrhea 20 times a day.

## 2024-03-04 NOTE — ED NOTES
Pt. States she has been throwing up and having persistent diarrhea.    Pt. Endorses she was dx with an infection from her gynecologist that she has been prescribed antibiotics for. States symptoms began before she started taking her antibiotics.

## 2024-03-04 NOTE — DISCHARGE INSTRUCTIONS
You were evaluated for abdominal pain .  Based on your work-up it was deemed that she was stable for discharge.  Please  your medication of bentyl and zofran which was prescribed to you.  Please follow-up with your primary care physician if you have any further concerns and go over your work-up.  If you experience any chest pain, shortness of breath, worsening abdominal pain, vomiting blood, worsening headache, seizures, or any worsening of your symptoms please return to the emergency department immediately.  If you have any pending results or any further questions please contact the emergency department at [phone]

## 2024-03-04 NOTE — ED PROVIDER NOTES
EMERGENCY DEPARTMENT HISTORY AND PHYSICAL EXAM    6:32 AM      Date: 3/4/2024  Patient Name: Jigna De Jesus    History of Presenting Illness     Chief Complaint   Patient presents with    Abdominal Pain    Emesis    Nausea       History From: Patient  HPI  28-year-old female with history of vulval anemia, acute vaginitis with PID, herpes simplex virus who presents with abdominal pain, nausea, vomiting.  Patient says that symptoms have been going on for the past 3 to 4 L states that she completed a course of doxycycline and Flagyl for 10 days.  Denies any changes in medications, sick contacts, or any other changes.  No aggravating or alleviating factors.  When assessing ROS she denies any fevers, chest pain, shortness of breath, hematuria, or any other changes.     Nursing Notes were all reviewed and agreed with or any disagreements were addressed in the HPI.    PCP: Saad Maria, DO    No current facility-administered medications for this encounter.     Current Outpatient Medications   Medication Sig Dispense Refill    ondansetron (ZOFRAN) 4 MG tablet Take 1 tablet by mouth 3 times daily as needed for Nausea or Vomiting 15 tablet 0    dicyclomine (BENTYL) 20 MG tablet Take 1 tablet by mouth 4 times daily for 7 days 28 tablet 0    nitrofurantoin, macrocrystal-monohydrate, (MACROBID) 100 MG capsule Take 100 mg by mouth 2 times daily      dicyclomine (BENTYL) 20 MG tablet Take 20 mg by mouth in the morning and 20 mg at noon and 20 mg in the evening and 20 mg before bedtime.      metoclopramide (REGLAN) 10 MG tablet Take 10 mg by mouth every 6 hours as needed         Past History     Past Medical History:  Past Medical History:   Diagnosis Date    Abnormal Papanicolaou smear of cervix     2018, 2019    Interstitial cystitis     Interstitial cystitis     Nicotine vapor product user     Vulvodynia        Past Surgical History:  Past Surgical History:   Procedure Laterality Date    REFRACTIVE SURGERY

## 2024-03-05 LAB
C DIFF GDH STL QL: NEGATIVE
C DIFF TOX A+B STL QL IA: NEGATIVE
C DIFF TOXIN INTERPRETATION: NORMAL

## 2024-04-10 ENCOUNTER — HOSPITAL ENCOUNTER (EMERGENCY)
Facility: HOSPITAL | Age: 29
Discharge: HOME | End: 2024-04-10
Payer: COMMERCIAL

## 2024-04-10 ENCOUNTER — HOSPITAL ENCOUNTER (EMERGENCY)
Facility: HOSPITAL | Age: 29
Discharge: HOME | End: 2024-04-13
Payer: COMMERCIAL

## 2024-04-10 VITALS
DIASTOLIC BLOOD PRESSURE: 55 MMHG | OXYGEN SATURATION: 98 % | TEMPERATURE: 98 F | HEIGHT: 61 IN | HEART RATE: 79 BPM | SYSTOLIC BLOOD PRESSURE: 116 MMHG | WEIGHT: 230 LBS | BODY MASS INDEX: 43.43 KG/M2 | RESPIRATION RATE: 16 BRPM

## 2024-04-10 DIAGNOSIS — R30.0 DYSURIA: ICD-10-CM

## 2024-04-10 DIAGNOSIS — R10.2 PELVIC PAIN: Primary | ICD-10-CM

## 2024-04-10 LAB
APPEARANCE UR: CLEAR
BACTERIA URNS QL MICRO: NEGATIVE /HPF
BILIRUB UR QL: NEGATIVE
COLOR UR: YELLOW
EPITH CASTS URNS QL MICRO: NORMAL /LPF (ref 0–5)
GLUCOSE UR STRIP.AUTO-MCNC: NEGATIVE MG/DL
HCG UR QL: NEGATIVE
HCG UR QL: NEGATIVE
HGB UR QL STRIP: NEGATIVE
KETONES UR QL STRIP.AUTO: ABNORMAL MG/DL
LEUKOCYTE ESTERASE UR QL STRIP.AUTO: ABNORMAL
NITRITE UR QL STRIP.AUTO: NEGATIVE
PH UR STRIP: 5.5 (ref 5–8)
PROT UR STRIP-MCNC: NEGATIVE MG/DL
RBC #/AREA URNS HPF: NEGATIVE /HPF (ref 0–5)
SERVICE CMNT-IMP: NORMAL
SP GR UR REFRACTOMETRY: 1.02 (ref 1–1.03)
UROBILINOGEN UR QL STRIP.AUTO: 0.2 EU/DL (ref 0.2–1)
WBC URNS QL MICRO: NORMAL /HPF (ref 0–5)
WET PREP GENITAL: NORMAL

## 2024-04-10 PROCEDURE — 93975 VASCULAR STUDY: CPT

## 2024-04-10 PROCEDURE — 87661 TRICHOMONAS VAGINALIS AMPLIF: CPT

## 2024-04-10 PROCEDURE — 6370000000 HC RX 637 (ALT 250 FOR IP): Performed by: NURSE PRACTITIONER

## 2024-04-10 PROCEDURE — 81025 URINE PREGNANCY TEST: CPT

## 2024-04-10 PROCEDURE — 2500000003 HC RX 250 WO HCPCS: Performed by: NURSE PRACTITIONER

## 2024-04-10 PROCEDURE — 6360000002 HC RX W HCPCS: Performed by: NURSE PRACTITIONER

## 2024-04-10 PROCEDURE — 87491 CHLMYD TRACH DNA AMP PROBE: CPT

## 2024-04-10 PROCEDURE — 81001 URINALYSIS AUTO W/SCOPE: CPT

## 2024-04-10 PROCEDURE — 96372 THER/PROPH/DIAG INJ SC/IM: CPT

## 2024-04-10 PROCEDURE — 99284 EMERGENCY DEPT VISIT MOD MDM: CPT

## 2024-04-10 PROCEDURE — 87210 SMEAR WET MOUNT SALINE/INK: CPT

## 2024-04-10 PROCEDURE — 87591 N.GONORRHOEAE DNA AMP PROB: CPT

## 2024-04-10 RX ORDER — ONDANSETRON 4 MG/1
4 TABLET, ORALLY DISINTEGRATING ORAL
Status: COMPLETED | OUTPATIENT
Start: 2024-04-10 | End: 2024-04-10

## 2024-04-10 RX ORDER — METRONIDAZOLE 500 MG/1
500 TABLET ORAL 2 TIMES DAILY
Qty: 28 TABLET | Refills: 0 | Status: SHIPPED | OUTPATIENT
Start: 2024-04-10 | End: 2024-04-24

## 2024-04-10 RX ORDER — HYDROCODONE BITARTRATE AND ACETAMINOPHEN 5; 325 MG/1; MG/1
1 TABLET ORAL
Status: COMPLETED | OUTPATIENT
Start: 2024-04-10 | End: 2024-04-10

## 2024-04-10 RX ORDER — METRONIDAZOLE 250 MG/1
500 TABLET ORAL
Status: COMPLETED | OUTPATIENT
Start: 2024-04-10 | End: 2024-04-10

## 2024-04-10 RX ORDER — DOXYCYCLINE 100 MG/1
100 CAPSULE ORAL
Status: COMPLETED | OUTPATIENT
Start: 2024-04-10 | End: 2024-04-10

## 2024-04-10 RX ORDER — ONDANSETRON 4 MG/1
4 TABLET, ORALLY DISINTEGRATING ORAL 3 TIMES DAILY PRN
Qty: 21 TABLET | Refills: 0 | Status: SHIPPED | OUTPATIENT
Start: 2024-04-10

## 2024-04-10 RX ORDER — DOXYCYCLINE HYCLATE 100 MG
100 TABLET ORAL 2 TIMES DAILY
Qty: 28 TABLET | Refills: 0 | Status: SHIPPED | OUTPATIENT
Start: 2024-04-10 | End: 2024-04-24

## 2024-04-10 RX ADMIN — LIDOCAINE HYDROCHLORIDE 500 MG: 10 INJECTION, SOLUTION EPIDURAL; INFILTRATION; INTRACAUDAL; PERINEURAL at 15:13

## 2024-04-10 RX ADMIN — ONDANSETRON 4 MG: 4 TABLET, ORALLY DISINTEGRATING ORAL at 12:01

## 2024-04-10 RX ADMIN — METRONIDAZOLE 500 MG: 250 TABLET ORAL at 15:13

## 2024-04-10 RX ADMIN — DOXYCYCLINE 100 MG: 100 CAPSULE ORAL at 15:13

## 2024-04-10 RX ADMIN — HYDROCODONE BITARTRATE AND ACETAMINOPHEN 1 TABLET: 5; 325 TABLET ORAL at 12:01

## 2024-04-10 ASSESSMENT — PAIN SCALES - GENERAL: PAINLEVEL_OUTOF10: 8

## 2024-04-10 ASSESSMENT — PAIN DESCRIPTION - LOCATION: LOCATION: VAGINA

## 2024-04-10 ASSESSMENT — PAIN - FUNCTIONAL ASSESSMENT: PAIN_FUNCTIONAL_ASSESSMENT: 0-10

## 2024-04-10 NOTE — ED PROVIDER NOTES
[facility] EMERGENCY DEPT  EMERGENCY DEPARTMENT ENCOUNTER       Pt Name: Jigna De Jesus  MRN: [id number]  Birthdate 1995  Date of evaluation: 4/10/2024  PCP: Saad Maria DO  Note Started: 3:29 PM 4/10/24     CHIEF COMPLAINT       Chief Complaint   Patient presents with    Dysuria    Vaginitis        HISTORY OF PRESENT ILLNESS: 1 or more elements      History From: Patient  HPI Limitations: None  Chronic Conditions: Interstitial cystitis, vulvodynia, HSV  Social Determinants affecting Dx or Tx: None     Jigna De Jesus is a 28 y.o. female with history of interstitial cystitis, vulvodynia, HSV who presents to ED c/o dysuria, vaginitis and pelvic pain.  Patient reports she has had frequent pelvic infections and her GYN at [facility] put her on \"some antibiotics\" over the last couple of months but she is not sure what.  Patient reports current symptoms began about 2 days ago.  She is reporting dysuria, urinary urgency or frequency without hematuria, white vaginal discharge that is at baseline as well as vaginal burning and uncomfortable sensation that feels like \"worms and rotten meat.\"  Patient states the pain gets so bad that she occasionally has become incontinent, no saddle anesthesia.  Patient denies vomiting but reports she has gotten nauseous with the pain occasionally, no diarrhea or constipation.     Nursing Notes were all reviewed and agreed with or any disagreements were addressed in the HPI.    PAST HISTORY     Past Medical History:  Past Medical History:   Diagnosis Date    Abnormal Papanicolaou smear of cervix     2018, 2019    Interstitial cystitis     Interstitial cystitis     Nicotine vapor product user     Vulvodynia        Past Surgical History:  Past Surgical History:   Procedure Laterality Date    REFRACTIVE SURGERY         Family History:  No family history on file.    Social History:  Social History     Socioeconomic History    Marital status: Single   Tobacco Use    Smoking

## 2024-04-12 LAB
C TRACH RRNA SPEC QL NAA+PROBE: NEGATIVE
N GONORRHOEA RRNA SPEC QL NAA+PROBE: NEGATIVE
SPECIMEN SOURCE: NORMAL
T VAGINALIS RRNA SPEC QL NAA+PROBE: NEGATIVE